# Patient Record
Sex: MALE | Race: WHITE | NOT HISPANIC OR LATINO | URBAN - METROPOLITAN AREA
[De-identification: names, ages, dates, MRNs, and addresses within clinical notes are randomized per-mention and may not be internally consistent; named-entity substitution may affect disease eponyms.]

---

## 2019-04-12 ENCOUNTER — EMERGENCY (EMERGENCY)
Facility: HOSPITAL | Age: 29
LOS: 1 days | Discharge: HOME | End: 2019-04-12
Attending: STUDENT IN AN ORGANIZED HEALTH CARE EDUCATION/TRAINING PROGRAM
Payer: COMMERCIAL

## 2019-04-12 VITALS — WEIGHT: 179.9 LBS | HEIGHT: 67 IN

## 2019-04-12 LAB
ALBUMIN SERPL ELPH-MCNC: 4.6 G/DL — SIGNIFICANT CHANGE UP (ref 3.3–5.2)
ALP SERPL-CCNC: 87 U/L — SIGNIFICANT CHANGE UP (ref 40–120)
ALT FLD-CCNC: 31 U/L — SIGNIFICANT CHANGE UP
ANION GAP SERPL CALC-SCNC: 12 MMOL/L — SIGNIFICANT CHANGE UP (ref 5–17)
AST SERPL-CCNC: 28 U/L — SIGNIFICANT CHANGE UP
BASOPHILS # BLD AUTO: 0 K/UL — SIGNIFICANT CHANGE UP (ref 0–0.2)
BASOPHILS NFR BLD AUTO: 0.4 % — SIGNIFICANT CHANGE UP (ref 0–2)
BILIRUB SERPL-MCNC: 0.5 MG/DL — SIGNIFICANT CHANGE UP (ref 0.4–2)
BUN SERPL-MCNC: 21 MG/DL — HIGH (ref 8–20)
CALCIUM SERPL-MCNC: 9.4 MG/DL — SIGNIFICANT CHANGE UP (ref 8.6–10.2)
CHLORIDE SERPL-SCNC: 103 MMOL/L — SIGNIFICANT CHANGE UP (ref 98–107)
CO2 SERPL-SCNC: 25 MMOL/L — SIGNIFICANT CHANGE UP (ref 22–29)
CREAT SERPL-MCNC: 1.06 MG/DL — SIGNIFICANT CHANGE UP (ref 0.5–1.3)
EOSINOPHIL # BLD AUTO: 0.1 K/UL — SIGNIFICANT CHANGE UP (ref 0–0.5)
EOSINOPHIL NFR BLD AUTO: 2.6 % — SIGNIFICANT CHANGE UP (ref 0–5)
GLUCOSE SERPL-MCNC: 103 MG/DL — SIGNIFICANT CHANGE UP (ref 70–115)
HCT VFR BLD CALC: 43.5 % — SIGNIFICANT CHANGE UP (ref 42–52)
HGB BLD-MCNC: 15.5 G/DL — SIGNIFICANT CHANGE UP (ref 14–18)
LYMPHOCYTES # BLD AUTO: 1.3 K/UL — SIGNIFICANT CHANGE UP (ref 1–4.8)
LYMPHOCYTES # BLD AUTO: 28.3 % — SIGNIFICANT CHANGE UP (ref 20–55)
MAGNESIUM SERPL-MCNC: 2 MG/DL — SIGNIFICANT CHANGE UP (ref 1.6–2.6)
MCHC RBC-ENTMCNC: 31.8 PG — HIGH (ref 27–31)
MCHC RBC-ENTMCNC: 35.6 G/DL — SIGNIFICANT CHANGE UP (ref 32–36)
MCV RBC AUTO: 89.1 FL — SIGNIFICANT CHANGE UP (ref 80–94)
MONOCYTES # BLD AUTO: 0.2 K/UL — SIGNIFICANT CHANGE UP (ref 0–0.8)
MONOCYTES NFR BLD AUTO: 5.1 % — SIGNIFICANT CHANGE UP (ref 3–10)
NEUTROPHILS # BLD AUTO: 3 K/UL — SIGNIFICANT CHANGE UP (ref 1.8–8)
NEUTROPHILS NFR BLD AUTO: 63.6 % — SIGNIFICANT CHANGE UP (ref 37–73)
PHOSPHATE SERPL-MCNC: 3 MG/DL — SIGNIFICANT CHANGE UP (ref 2.4–4.7)
PLATELET # BLD AUTO: 182 K/UL — SIGNIFICANT CHANGE UP (ref 150–400)
POTASSIUM SERPL-MCNC: 4.2 MMOL/L — SIGNIFICANT CHANGE UP (ref 3.5–5.3)
POTASSIUM SERPL-SCNC: 4.2 MMOL/L — SIGNIFICANT CHANGE UP (ref 3.5–5.3)
PROT SERPL-MCNC: 7.2 G/DL — SIGNIFICANT CHANGE UP (ref 6.6–8.7)
RBC # BLD: 4.88 M/UL — SIGNIFICANT CHANGE UP (ref 4.6–6.2)
RBC # FLD: 12.2 % — SIGNIFICANT CHANGE UP (ref 11–15.6)
SODIUM SERPL-SCNC: 140 MMOL/L — SIGNIFICANT CHANGE UP (ref 135–145)
WBC # BLD: 4.7 K/UL — LOW (ref 4.8–10.8)
WBC # FLD AUTO: 4.7 K/UL — LOW (ref 4.8–10.8)

## 2019-04-12 PROCEDURE — 93005 ELECTROCARDIOGRAM TRACING: CPT

## 2019-04-12 PROCEDURE — 83735 ASSAY OF MAGNESIUM: CPT

## 2019-04-12 PROCEDURE — 36415 COLL VENOUS BLD VENIPUNCTURE: CPT

## 2019-04-12 PROCEDURE — 80053 COMPREHEN METABOLIC PANEL: CPT

## 2019-04-12 PROCEDURE — 85027 COMPLETE CBC AUTOMATED: CPT

## 2019-04-12 PROCEDURE — 84100 ASSAY OF PHOSPHORUS: CPT

## 2019-04-12 PROCEDURE — 73140 X-RAY EXAM OF FINGER(S): CPT

## 2019-04-12 PROCEDURE — 93010 ELECTROCARDIOGRAM REPORT: CPT

## 2019-04-12 PROCEDURE — 73140 X-RAY EXAM OF FINGER(S): CPT | Mod: 26,RT

## 2019-04-12 PROCEDURE — 99284 EMERGENCY DEPT VISIT MOD MDM: CPT | Mod: 25

## 2019-04-12 PROCEDURE — 96374 THER/PROPH/DIAG INJ IV PUSH: CPT

## 2019-04-12 PROCEDURE — 99218: CPT

## 2019-04-12 PROCEDURE — G0378: CPT

## 2019-04-12 RX ORDER — KETOROLAC TROMETHAMINE 30 MG/ML
30 SYRINGE (ML) INJECTION ONCE
Qty: 0 | Refills: 0 | Status: DISCONTINUED | OUTPATIENT
Start: 2019-04-12 | End: 2019-04-12

## 2019-04-12 RX ORDER — IBUPROFEN 200 MG
600 TABLET ORAL EVERY 6 HOURS
Qty: 0 | Refills: 0 | Status: DISCONTINUED | OUTPATIENT
Start: 2019-04-12 | End: 2019-04-17

## 2019-04-12 RX ORDER — KETOROLAC TROMETHAMINE 30 MG/ML
60 SYRINGE (ML) INJECTION ONCE
Qty: 0 | Refills: 0 | Status: DISCONTINUED | OUTPATIENT
Start: 2019-04-12 | End: 2019-04-12

## 2019-04-12 RX ADMIN — Medication 30 MILLIGRAM(S): at 16:57

## 2019-04-12 NOTE — ED CDU PROVIDER INITIAL DAY NOTE - ATTENDING CONTRIBUTION TO CARE
Pt. well appearing. NO distress. NO open wound noted to right thumb. Pt. will be observed. I have discussed the plan with the ACP.

## 2019-04-12 NOTE — ED CDU PROVIDER INITIAL DAY NOTE - PHYSICAL EXAMINATION
Right thumb: no visible puncture wound, no swelling, no erythema, cap refill < 2 seconds, NVI, tendons intact. + FROM

## 2019-04-12 NOTE — CONSULT NOTE ADULT - SUBJECTIVE AND OBJECTIVE BOX
Pt Name: SVETA FORTE    MRN: [id number]      Patient is a 28y Male presenting to the emergency department with right 1st digit pain x 2.5 hours ago. Patient is a  stating that he was working with a hydraulic hose on a work truck when a valve came off and help his thumb over the area. After the incident the patient states that the tip of his thumb and other 4 digits were tingling and he felt nauseous and vomitted. Patient states he is right hand dominant and denies any loss of motion of the digit. Patient denies fever/chills, HA, numbness.       REVIEW OF SYSTEMS    General: Alert, responsive, in NAD      Musculoskeletal: SEE HPI.    Neurological: No sensory or motor changes.       PAST MEDICAL & SURGICAL HISTORY:  PAST MEDICAL & SURGICAL HISTORY:      Allergies: No Known Allergies      Medications:     FAMILY HISTORY:  : non-contributory                          15.5   4.7   )-----------( 182      ( 12 Apr 2019 17:05 )             43.5       04-12    140  |  103  |  21.0<H>  ----------------------------<  103  4.2   |  25.0  |  1.06    Ca    9.4      12 Apr 2019 17:05  Phos  3.0     04-12  Mg     2.0     04-12    TPro  7.2  /  Alb  4.6  /  TBili  0.5  /  DBili  x   /  AST  28  /  ALT  31  /  AlkPhos  87  04-12      Vital Signs Last 24 Hrs  T(C): 36.9 (12 Apr 2019 14:55), Max: 36.9 (12 Apr 2019 14:55)  T(F): 98.5 (12 Apr 2019 14:55), Max: 98.5 (12 Apr 2019 14:55)  HR: 86 (12 Apr 2019 14:55) (86 - 86)  BP: 135/96 (12 Apr 2019 14:55) (135/96 - 135/96)  BP(mean): --  RR: 18 (12 Apr 2019 14:55) (18 - 18)  SpO2: 99% (12 Apr 2019 14:55) (99% - 99%)    Daily Height in cm: 170.18 (12 Apr 2019 14:54)    Daily       PHYSICAL EXAM:      Appearance: Alert, responsive, in no acute distress, lying in bed    Neurological: Sensation is grossly intact to light touch. 5/5 motor function of all extremities. No focal deficits or weaknesses found.    Skin: no rash on visible skin. Skin is clean, dry and intact. No bleeding. No abrasions. No ulcerations noted.    Vascular: 2+ distal pulses. Cap refill < 2 sec. No signs of venous insufficiency or stasis. No extremity ulcerations. No cyanosis.    Musculoskeletal:         Right Upper Extremity: 5/5 WE/WF, Full ROM of digits, +  strength. 5/5 Flexion/extension of MCP and IP of 1st digit. Non tender to palpation along first digit. Cap refil <2 seconds    Image studies:     A/P:  Pt is a 28y Male s/p right thumb injury without fracture or dislocation    PLAN:   - pain control   - xrays reviewed  - D/W Dr. Barreto, patient will follow up outpatient on 4/15/2019 and recommended splint Pt Name: SVETA FORTE    MRN: [id number]      Patient is a 28y Male presenting to the emergency department with right 1st digit pain x 2.5 hours ago. Patient is a  stating that he was working with a hydraulic hose on a work truck when a valve came off and he placed his thumb over the area. After the incident the patient states that the tip of his thumb and other 4 digits were tingling and he felt nauseous and vomited one time. Patient states he is right hand dominant and denies any loss of motion of the digit. Patient denies fever/chills, HA, numbness.       REVIEW OF SYSTEMS    General: Alert, responsive, in NAD    Musculoskeletal: SEE HPI.    Neurological: No sensory or motor changes.       PAST MEDICAL & SURGICAL HISTORY:  PAST MEDICAL & SURGICAL HISTORY:      Allergies: No Known Allergies      Medications:     FAMILY HISTORY:  : non-contributory                          15.5   4.7   )-----------( 182      ( 12 Apr 2019 17:05 )             43.5       04-12    140  |  103  |  21.0<H>  ----------------------------<  103  4.2   |  25.0  |  1.06    Ca    9.4      12 Apr 2019 17:05  Phos  3.0     04-12  Mg     2.0     04-12    TPro  7.2  /  Alb  4.6  /  TBili  0.5  /  DBili  x   /  AST  28  /  ALT  31  /  AlkPhos  87  04-12      Vital Signs Last 24 Hrs  T(C): 36.9 (12 Apr 2019 14:55), Max: 36.9 (12 Apr 2019 14:55)  T(F): 98.5 (12 Apr 2019 14:55), Max: 98.5 (12 Apr 2019 14:55)  HR: 86 (12 Apr 2019 14:55) (86 - 86)  BP: 135/96 (12 Apr 2019 14:55) (135/96 - 135/96)  BP(mean): --  RR: 18 (12 Apr 2019 14:55) (18 - 18)  SpO2: 99% (12 Apr 2019 14:55) (99% - 99%)    Daily Height in cm: 170.18 (12 Apr 2019 14:54)    Daily       PHYSICAL EXAM:      Appearance: Alert, responsive, in no acute distress, lying in bed    Neurological: Sensation is grossly intact to light touch. 5/5 motor function of all extremities. No focal deficits or weaknesses found.    Skin: no rash on visible skin. Skin is clean, dry and intact. No bleeding. No abrasions. No ulcerations noted.    Vascular: 2+ distal pulses. Cap refill < 2 sec. No signs of venous insufficiency or stasis. No extremity ulcerations. No cyanosis.    Musculoskeletal:         Right Upper Extremity: 5/5 WE/WF, Full ROM of digits, +  strength. 5/5 Flexion/extension of MCP and IP of 1st digit. Non tender to palpation along first digit. Cap refil <2 seconds    Image studies:     A/P:  Pt is a 28y Male s/p right thumb pain    PLAN:   - Pain control   - x-rays reviewed  - D/W Dr. Barreto, patient will follow up outpatient on 4/15/2019 and recommended splint Pt Name: SVETA FORTE    MRN: [id number]      Patient is a 28y Male presenting to the emergency department with right 1st digit pain x 2.5 hours ago. Patient is a  stating that he was working with a hydraulic hose on a work truck when a valve came off and he placed his thumb over the area. After the incident the patient states that the tip of his thumb and other 4 digits were tingling with numbness going up the volar aspect of the forearm. States he also felt nauseous and vomited one time. Since the incident patient reports numbness along the forearm resolved completely. Patient states he is right hand dominant and denies any loss of motion of the digit. Patient denies fever/chills, HA, numbness.       REVIEW OF SYSTEMS    General: Alert, responsive, in NAD    Musculoskeletal: SEE HPI.    Neurological: No sensory or motor changes.       PAST MEDICAL & SURGICAL HISTORY:  PAST MEDICAL & SURGICAL HISTORY:      Allergies: No Known Allergies      Medications:     FAMILY HISTORY:  : non-contributory                          15.5   4.7   )-----------( 182      ( 12 Apr 2019 17:05 )             43.5       04-12    140  |  103  |  21.0<H>  ----------------------------<  103  4.2   |  25.0  |  1.06    Ca    9.4      12 Apr 2019 17:05  Phos  3.0     04-12  Mg     2.0     04-12    TPro  7.2  /  Alb  4.6  /  TBili  0.5  /  DBili  x   /  AST  28  /  ALT  31  /  AlkPhos  87  04-12      Vital Signs Last 24 Hrs  T(C): 36.9 (12 Apr 2019 14:55), Max: 36.9 (12 Apr 2019 14:55)  T(F): 98.5 (12 Apr 2019 14:55), Max: 98.5 (12 Apr 2019 14:55)  HR: 86 (12 Apr 2019 14:55) (86 - 86)  BP: 135/96 (12 Apr 2019 14:55) (135/96 - 135/96)  BP(mean): --  RR: 18 (12 Apr 2019 14:55) (18 - 18)  SpO2: 99% (12 Apr 2019 14:55) (99% - 99%)    Daily Height in cm: 170.18 (12 Apr 2019 14:54)    Daily       PHYSICAL EXAM:      Appearance: Alert, responsive, in no acute distress, lying in bed    Neurological: Sensation is grossly intact to light touch. 5/5 motor function of all extremities. No focal deficits or weaknesses found.    Skin: no rash on visible skin. Skin is clean, dry and intact. No bleeding. No abrasions. No ulcerations noted.    Vascular: 2+ distal pulses. Cap refill < 2 sec. No signs of venous insufficiency or stasis. No extremity ulcerations. No cyanosis.    Musculoskeletal:         Right Upper Extremity: 5/5 WE/WF, Full ROM of digits, +  strength. 5/5 Flexion/extension of MCP and IP of 1st digit. Non tender to palpation along first digit. Cap refil <2 seconds    Image studies:   preliminary read: no fracture or dislocation    A/P:  Pt is a 28y Male s/p right thumb pain    PLAN:   - Pain control   - x-rays reviewed  - D/W Dr. Barreto, patient will follow up outpatient on 4/15/2019 and recommended splint

## 2019-04-12 NOTE — ED STATDOCS - PROGRESS NOTE DETAILS
Dr. Nash consulted tox and spoke to Zackary Edwards (phone # [phone]) who recommended labs, EKG and hand consult. hand consult appreciated patient amendable to observation for neuro checks, care signed out to VIDAL Whitt

## 2019-04-12 NOTE — ED STATDOCS - OBJECTIVE STATEMENT
27 y/o M pt presents to ED c/o sudden onset right thumb pain x 1 hour PTA. 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 Reports a burning sensation in his right thumb. Washed his hand immediately PTA. IS not sure what the composition of the hydraulic fluid was. Did not fall or hit his head. No further complaints at this time.

## 2019-04-12 NOTE — ED STATDOCS - ATTENDING CONTRIBUTION TO CARE
I performed a face to face history and physical exam of the patient and discussed their management with the resident/ACP. I reviewed the resident/ACP's note and agree with the documented findings and plan of care.      Pt with R thumb pain s/p high-pressure hydraulic injury.  no definite break in skin seen.      physical - ttp over R thumb.    plan - ortho evaluated patient. labs reviewed. will put in CDU for neurovascular checks.

## 2019-04-12 NOTE — CONSULT NOTE ADULT - ATTENDING COMMENTS
I discussed the above patient's condition with our in house physician assistant and reviewed relevant clinical photos and imaging.  Agree with above assessment and plan.

## 2019-04-12 NOTE — ED STATDOCS - MUSCULOSKELETAL FINDINGS, MLM
R thumb, +FROM, motor and sensory sensation intact, cap refill < 2sec, radial pulse intact, adduction and abduction intact, skin warm and dry

## 2019-04-12 NOTE — ED CDU PROVIDER INITIAL DAY NOTE - MEDICAL DECISION MAKING DETAILS
29 y/o male admitted to observation s/p high pressure injury  evaluated by hand surgery in ED, will monitor overnight and re-eval in AM

## 2019-04-12 NOTE — ED ADULT TRIAGE NOTE - CHIEF COMPLAINT QUOTE
Pt was at work and hold his thumb over a hose with hydraulic fluid in it. Pt states that it was a lot of pressure going into his thumb and "now doesn't feel right". Pt thinks the fluid went through his skin.

## 2019-04-12 NOTE — ED ADULT NURSE NOTE - OBJECTIVE STATEMENT
28 yr old male a+ox4 presents to ED c/o numbness and burning to fingers on right hand s/p injury at work.  pt states he put his thumb over a hose that was leaking hydraulic fluid to try to prevent it from leaking.  pt able to move all fingers.  no sensory loss.  no obvious injuries present.

## 2019-04-13 VITALS
OXYGEN SATURATION: 100 % | RESPIRATION RATE: 17 BRPM | HEART RATE: 61 BPM | SYSTOLIC BLOOD PRESSURE: 94 MMHG | TEMPERATURE: 98 F | DIASTOLIC BLOOD PRESSURE: 56 MMHG

## 2019-04-13 PROCEDURE — 99217: CPT

## 2019-04-13 NOTE — PROGRESS NOTE ADULT - SUBJECTIVE AND OBJECTIVE BOX
Patient seen and examined bedside around 03:00.  Resting comfortably.  Has no complaints at this time.  Noticed significant improvement to the pain in his thumb.  Full active ROM.  No obvious swelling or redness to thumb.  No entry wound visible.  Denies numbness/tingling.  No pain to palpation

## 2019-04-13 NOTE — ED CDU PROVIDER DISPOSITION NOTE - ATTENDING CONTRIBUTION TO CARE
Pt. has been observed in the ED with no alarming issues. Pt. will need to follow up with the hand surgeon. I have discussed the plan with the ACP.

## 2019-04-13 NOTE — ED ADULT NURSE REASSESSMENT NOTE - RESPIRATORY ASSESSMENT
Pt has upcoming preop/clearance appointment 05/22/17 but there is no DOS.  Called to [facility] and they are not aware of any scheduled surgery for pt and pt has not called to setup bridging of front teeth that was recommended at last visit with them November 2016.  [facility] [place].  Need to know DOS, location, what is being done and any requested preop info.  [facility] will not do preop with out DOS.   
WDL
WDL

## 2019-04-13 NOTE — ED CDU PROVIDER SUBSEQUENT DAY NOTE - MEDICAL DECISION MAKING DETAILS
Pt in CDU for overnight eval and neuro checks. No new findings, or worsening. Stable for dc. D/c with f/u with Dr. Barreto as outpatient.

## 2019-04-13 NOTE — ED ADULT NURSE REASSESSMENT NOTE - NS ED NURSE REASSESS COMMENT FT1
Dc instructions provided. educated pt with all written instructions. pt verbalizes understanding. pt with no questions or concerns. VSS and documented as per flow sheet. pt ambulated out ED with steady gait. ED registration made aware of pt's dispo status.
pt sleeping in stretcher, no apparent distress noted. denies any headache, dizziness, numbness or tingling.   bed in lowest position call bell within reach  and safety maintained.
pt sleeping in stretcher, no apparent distress noted. bed in lowest position and safety maintained.
assumed pt care from previous Rn Melissa Cody.  pt transported to CDU-8 for observation. pt  A&Ox3;  resting in stretcher, with no complaints of pain or discomfort. pt stated he was at working and put his thumb over a hose and some hydraulic fluid came out. pt reported he felt like he a nail went through his skin so he came to ED.   right thumb with no signs of redness. (+) radial pulse. able to move all extremities. B/L lungs clear, normal s1&s2 heard on ausculation. (+) pedal pulses, skin warm/dry intact. IV patent. VSS and documented as per flow sheet. plan of care reviewed and pt verbalizes understanding. bed in lowest position, call bell within reach and safety maintained. monitoring ongoing for any changes.

## 2019-04-13 NOTE — ED CDU PROVIDER DISPOSITION NOTE - CLINICAL COURSE
27 y/o M pt presents to ED c/o sudden onset right thumb pain x 1 hour PTA. 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 Reports a burning sensation in his right thumb. Washed his hand immediately PTA. Pt is not sure what the composition of the hydraulic fluid was. Did not fall or hit his head. No further complaints at this time. No new findings or worsening of clinical exam over night. NV intact, no new swelling, erythema, warmth. VS stable, pt stable for dc at this time. Ortho note appreciated to follow-up with Ortho Dr. Barreto as outpatient on 4/15.

## 2019-04-13 NOTE — ED CDU PROVIDER SUBSEQUENT DAY NOTE - HISTORY
29 y/o M pt presents to ED c/o sudden onset right thumb pain x 1 hour PTA. 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 Reports a burning sensation in his right thumb. Washed his hand immediately PTA. Pt is not sure what the composition of the hydraulic fluid was. Did not fall or hit his head. No further complaints over night.

## 2019-05-03 PROBLEM — Z78.9 OTHER SPECIFIED HEALTH STATUS: Chronic | Status: ACTIVE | Noted: 2019-04-12

## 2019-05-06 ENCOUNTER — APPOINTMENT (OUTPATIENT)
Dept: ORTHOPEDIC SURGERY | Facility: CLINIC | Age: 29
End: 2019-05-06
Payer: COMMERCIAL

## 2019-05-06 VITALS — BODY MASS INDEX: 28.79 KG/M2 | WEIGHT: 190 LBS | HEIGHT: 68 IN

## 2019-05-06 DIAGNOSIS — Z78.9 OTHER SPECIFIED HEALTH STATUS: ICD-10-CM

## 2019-05-06 DIAGNOSIS — Z80.9 FAMILY HISTORY OF MALIGNANT NEOPLASM, UNSPECIFIED: ICD-10-CM

## 2019-05-06 DIAGNOSIS — R22.31 LOCALIZED SWELLING, MASS AND LUMP, RIGHT UPPER LIMB: ICD-10-CM

## 2019-05-06 DIAGNOSIS — M79.641 PAIN IN RIGHT HAND: ICD-10-CM

## 2019-05-06 DIAGNOSIS — M77.9 ENTHESOPATHY, UNSPECIFIED: ICD-10-CM

## 2019-05-06 PROCEDURE — 73130 X-RAY EXAM OF HAND: CPT | Mod: RT

## 2019-05-06 PROCEDURE — 99203 OFFICE O/P NEW LOW 30 MIN: CPT | Mod: 25

## 2019-05-06 PROCEDURE — 20550 NJX 1 TENDON SHEATH/LIGAMENT: CPT | Mod: F7

## 2019-05-06 PROCEDURE — 73110 X-RAY EXAM OF WRIST: CPT | Mod: RT

## 2019-05-06 NOTE — ADDENDUM
[FreeTextEntry1] : This note was written by Olga Snowden on 05/06/2019 acting as scribe for Juan Terry M.D.\par \par All medical record entries made by the Scribe were at my, Dr. Juan Terry, direction and personally dictated by me on 05/06/2019. I have personally reviewed the chart and agree that the record accurately reflects my personal performance of the history, physical exam, assessment and plan.

## 2019-05-06 NOTE — END OF VISIT
[FreeTextEntry3] : I, Juan Terry MD, ordering physician, have read and attest that all the information, medical decision making and discharge instructions within are true and accurate\par

## 2019-05-06 NOTE — ASSESSMENT
[FreeTextEntry1] : 28 year old male presents with decreased sensation of the right ring fingertip secondary to a contusion as well as right ring and middle trigger fingers. We discussed the nature of the condition and treatment options. The patient wishes to proceed with a cortisone injection at this time. Under sterile precautions, an injection of 0.5 cc 1% lidocaine with 0.25 cc of Kenalog and 0.25 cc of Dexamethasone was placed into the right ring and long finger A1 pulleys. Rest and apply ice. Surgery is not recommended at this time, but it was described and discussed. Soak hand in warm water and Epsom salt. We discussed the use of NSAIDs as tolerated. \par An MRI of the right forearm will be ordered to evaluate the volar forearm mass.

## 2019-05-06 NOTE — HISTORY OF PRESENT ILLNESS
[Right] : right hand dominant [FreeTextEntry1] : Pt is a 29 y/o RHD male  c/o right long and right ring finger pain and triggering x 2-3 weeks.  The fingers lock in the morning.  He has tenderness over the A1 pulleys.  The symptoms improve throughout the day.\par He states that he jammed his right ring finger about 4 weeks ago when he was using a .  He slipped and jammed the finger into the handle.  He started to have pain and numbness afterwards.  He did not have swelling or ecchymosis.  He has not had xrays for this.  \par He has a mass in his right forearm that is tender to touch.\par He notes a history of CTS.  He has numbness and tingling when he wakes in the morning x 2-3 years.  He has never had an EMG or cortisone injection.  His PCP told him to wear a wrist support splint at night.  He wears it infrequently.  He is unsure if it wakes him from sleep because his children wake him throughout the night.\par He also notes a mass present over the right volar forearm that has been present for a few years.

## 2019-05-06 NOTE — PHYSICAL EXAM
[de-identified] : Patient is well-nourished, well developed, alert and in no acute distress. Breathing is unlabored. He is grossly oriented to person, place and time.\par \par Right Hand: There is tenderness to palpation over the volar proximal phalanx of the middle and ring fingers, no tenderness over the A1 pulleys. He has full arc of motion in the fingers. All intrinsic and extrinsic hand muscles are 5/5. There is no joint instability on provocative testing. Sensation is intact to light touch to all the fingers except the tip of the ring finger, he has some subjective decreased sensation. There are no skin lesions or discolorations. \par There is a small mass present over the volar aspect of the right forearm that is minimally tender to palpation.  [de-identified] : Right Wrist: AP, lateral and oblique views of the hand including the wrist were obtained and revealed no abnormalities. \par

## 2020-05-18 ENCOUNTER — APPOINTMENT (OUTPATIENT)
Dept: ORTHOPEDIC SURGERY | Facility: CLINIC | Age: 30
End: 2020-05-18

## 2020-05-28 ENCOUNTER — APPOINTMENT (OUTPATIENT)
Dept: ORTHOPEDIC SURGERY | Facility: CLINIC | Age: 30
End: 2020-05-28
Payer: COMMERCIAL

## 2020-05-28 DIAGNOSIS — G56.03 CARPAL TUNNEL SYNDROM,BILATERAL UPPER LIMBS: ICD-10-CM

## 2020-05-28 PROCEDURE — 99213 OFFICE O/P EST LOW 20 MIN: CPT | Mod: 25

## 2020-05-28 PROCEDURE — 20550 NJX 1 TENDON SHEATH/LIGAMENT: CPT | Mod: F7

## 2020-05-28 NOTE — DISCUSSION/SUMMARY
[FreeTextEntry1] : The underlying pathophysiology was reviewed with the patient. Treatment options were discussed including; observation, NSAIDS, analgesics, injection and surgical intervention. \par \par The patient wishes to proceed with a cortisone injection at this time. The skin was prepped with alcohol and sprayed with Ethyl Chloride. An injection of 0.5 cc 1% Lidocaine without epinephrine, 0.25 cc Kenalog 40mg, and 0.25 cc Dexamethasone was administered into the flexor tendon sheath of the right long finger (2/3). The patient tolerated the procedure well. Apply ice. \par \par The patient was advised to soak the hand in warm water and Epsom salt. \par NSAIDs, as tolerated, were advised for pain and symptom management. \par Follow up as needed.

## 2020-05-28 NOTE — ADDENDUM
[FreeTextEntry1] : I, Kelsie Mcghee wrote this note acting as a scribe for Dr. Juan Terry on May 28, 2020.

## 2020-05-28 NOTE — PHYSICAL EXAM
[de-identified] : Patient is well-nourished, well developed, alert and in no acute distress. Breathing is unlabored. He is grossly oriented to person, place and time.\par \par Right Hand: There is tenderness to palpation over the volar proximal phalanx of the middle finger. There is tenderness over the A1 pulleys. He has full arc of motion in the fingers. All intrinsic and extrinsic hand muscles are 5/5. There is no joint instability on provocative testing. Sensation is intact to light touch to all the fingers except the tip of the ring finger, he has some subjective decreased sensation. There are no skin lesions or discolorations. \par There is a small mass present over the volar aspect of the right forearm that is minimally tender to palpation.  [de-identified] : No imaging done today. \par

## 2020-07-23 ENCOUNTER — APPOINTMENT (OUTPATIENT)
Dept: ORTHOPEDIC SURGERY | Facility: CLINIC | Age: 30
End: 2020-07-23
Payer: COMMERCIAL

## 2020-07-23 DIAGNOSIS — M70.61 TROCHANTERIC BURSITIS, RIGHT HIP: ICD-10-CM

## 2020-07-23 DIAGNOSIS — S76.011A STRAIN OF MUSCLE, FASCIA AND TENDON OF RIGHT HIP, INITIAL ENCOUNTER: ICD-10-CM

## 2020-07-23 PROCEDURE — 99213 OFFICE O/P EST LOW 20 MIN: CPT | Mod: 25

## 2020-07-23 PROCEDURE — 20610 DRAIN/INJ JOINT/BURSA W/O US: CPT | Mod: RT

## 2020-07-24 NOTE — ADDENDUM
[FreeTextEntry1] : I, Kelsie Mcghee wrote this note acting as a scribe for Dr. Juan Terry on Jul 23, 2020.

## 2020-07-24 NOTE — HISTORY OF PRESENT ILLNESS
[de-identified] : Pt is a 28 y/o male who presents c/o right hip pain x 10 months.  He reports jumping over a small fence and when he landed on the leg, he had immediate and sudden pain. He states that the hip never returned to normal following that event. He localizes his pain laterally directly over the greater trochanter. He has pain when he walks and stands.  It is painful to go up and down stairs.  He states that the pain is the worse when he wakes up in the morning.  The hip feels tight.  He was seen by a chiropractor for his neck and he mentioned the hip pain.  He was sent for xray and MRI.  He states that the pain is worsening.\par He also reports radiating right-sided lower back pain. It extends into the buttocks and he drapes it along the posterior leg and into the lateral right ankle.

## 2020-07-24 NOTE — PHYSICAL EXAM
[de-identified] : MRI of the right hip taken on 12/12/2019 at [facility] revealed a Grade 1 strains of the right gluteal fernando and gluteus medius tendons insertions at the greater trochanter. Small bilateral joint effusions.  [de-identified] : Patient is WDWN, alert, and in no acute distress. Breathing is unlabored. He is grossly oriented to person, place, and time. \par \par Right Lower Extremity\par Buttock: no tenderness, swelling, or deformities\par Hip:\par Inspection/Palpation: Tenderness directly over the trochanteric bursa. \par Range of Motion: full and painless in all planes, no crepitus\par Stability: joint stability intact\par Strength: extension, flexion, adduction, abduction, internal rotation and external rotation 5/5

## 2020-10-15 ENCOUNTER — APPOINTMENT (OUTPATIENT)
Dept: ORTHOPEDIC SURGERY | Facility: CLINIC | Age: 30
End: 2020-10-15
Payer: COMMERCIAL

## 2020-10-15 DIAGNOSIS — G56.21 LESION OF ULNAR NERVE, RIGHT UPPER LIMB: ICD-10-CM

## 2020-10-15 DIAGNOSIS — G56.01 CARPAL TUNNEL SYNDROME, RIGHT UPPER LIMB: ICD-10-CM

## 2020-10-15 DIAGNOSIS — M65.331 TRIGGER FINGER, RIGHT MIDDLE FINGER: ICD-10-CM

## 2020-10-15 PROCEDURE — 20550 NJX 1 TENDON SHEATH/LIGAMENT: CPT | Mod: 59,F7

## 2020-10-15 PROCEDURE — 20526 THER INJECTION CARP TUNNEL: CPT | Mod: RT

## 2020-10-15 PROCEDURE — 99214 OFFICE O/P EST MOD 30 MIN: CPT | Mod: 25

## 2020-10-16 NOTE — END OF VISIT
[FreeTextEntry3] : I, Juan Terry MD, ordering physician, have read and attest that all the information, medical decision making and discharge instructions within are true and accurate.

## 2020-10-16 NOTE — ADDENDUM
[FreeTextEntry1] : I, Nella Yanez wrote this note acting as a scribe for Dr. Juan Terry on Oct 15, 2020.

## 2020-10-16 NOTE — DISCUSSION/SUMMARY
[de-identified] : The underlying pathophysiology was reviewed with the patient. Treatment options were discussed including; observation, NSAIDS, analgesics, injection and surgical intervention. \par \par Dx. Stenosing tenosynovitis \par The patient wishes to proceed with a cortisone injection at this time. The skin was prepped with alcohol and sprayed with Ethyl Chloride. An injection of 0.5 cc 1% Lidocaine without epinephrine, 0.25 cc Kenalog 40mg, and 0.25 cc Dexamethasone was administered into the flexor tendon sheath of the right long finger. The patient tolerated the procedure well. Apply ice. \par \par The patient wishes to proceed with a cortisone injection at this time. The skin was prepped with alcohol and sprayed with Ethyl Chloride. An injection of 0.5 cc 1% Lidocaine without epinephrine, 0.25 cc Kenalog 40 mg, and 0.25 cc Dexamethasone was administered into the right carpal tunnel. The patient tolerated the procedure well. Apply ice. \par \par The patient was encouraged to utilize a carpal tunnel wrist brace if he is to engage in any strenuous activity or while sleeping to avoid unintended compression of the median nerve. \par He should soak the hand in warm water and Epsom salts.\par NSAIDs as tolerated. \par The patient wishes to proceed with an EMG. A script was given.

## 2020-10-16 NOTE — HISTORY OF PRESENT ILLNESS
[de-identified] : Pt is a 28 y/o male who presents c/o right hip pain x 10 months.  He reports jumping over a small fence and when he landed on the leg, he had immediate and sudden pain. He states that the hip never returned to normal following that event. He localizes his pain laterally directly over the greater trochanter. He has pain when he walks and stands.  It is painful to go up and down stairs.  He states that the pain is the worse when he wakes up in the morning.  The hip feels tight.  He was seen by a chiropractor for his neck and he mentioned the hip pain.  He was sent for xray and MRI.  He states that the pain is worsening.\par He also reports radiating right-sided lower back pain. It extends into the buttocks and he drapes it along the posterior leg and into the lateral right ankle. \par He returns stating that his right long trigger finger has returned as well as his thumb and index fingers. He states that he wakes up from numbness in his hand.

## 2020-10-16 NOTE — PHYSICAL EXAM
[de-identified] : Patient is WDWN, alert, and in no acute distress. Breathing is unlabored. He is grossly oriented to person, place, and time. \par \par Right hand: There is A1 pulley tenderness in the thumb, index, and long finger. Full arc of motion in the fingers, and all intrinsic and extrinsic hand muscles 5/5. No joint instability on provocative testing, sensation is intact to light touch, and no skin lesions or discoloration. \par \par Right Wrist: No tenderness, edema, or deformities. No thenar atrophy. Full ROM with decreased sensation along median nerve distribution. \par Tests/Signs: Tinel's sign is positive over carpal tunnel, Phalen's test is positive. \par \par Right Ulnar Nerve weakness. \par \par Right Wrist: Mass on the ulnar side\par \par Right Lower Extremity\par Buttock: no tenderness, swelling, or deformities\par Hip:\par Inspection/Palpation: Tenderness directly over the trochanteric bursa. \par Range of Motion: full and painless in all planes, no crepitus\par Stability: joint stability intact\par Strength: extension, flexion, adduction, abduction, internal rotation and external rotation 5/5  [de-identified] : MRI of the right hip taken on 12/12/2019 at [facility] revealed a Grade 1 strains of the right gluteal fernando and gluteus medius tendons insertions at the greater trochanter. Small bilateral joint effusions.

## 2021-10-30 ENCOUNTER — TRANSCRIPTION ENCOUNTER (OUTPATIENT)
Age: 31
End: 2021-10-30

## 2022-12-05 NOTE — ED STATDOCS - NS ED MD EM SELECTION
Rest and encourage oral fluids as much as possible  Use saline nasal spray in each nostril several times per day to help clear out drainage  Elevate head of bed if possible  May use cool mist humidifier in room   May give honey for sore throat or cough  Follow up if fever >101 develops, if condition worsens, or with other problems or concerns  Parent states understanding and agrees with treatment plan  [id number] Comprehensive

## 2023-12-15 ENCOUNTER — APPOINTMENT (OUTPATIENT)
Dept: CT IMAGING | Facility: IMAGING CENTER | Age: 33
End: 2023-12-15

## 2023-12-15 PROCEDURE — 70450 CT HEAD/BRAIN W/O DYE: CPT | Mod: 26

## 2023-12-19 ENCOUNTER — OUTPATIENT (OUTPATIENT)
Dept: OUTPATIENT SERVICES | Facility: HOSPITAL | Age: 33
LOS: 1 days | End: 2023-12-19
Payer: COMMERCIAL

## 2023-12-19 VITALS
HEART RATE: 70 BPM | OXYGEN SATURATION: 97 % | RESPIRATION RATE: 16 BRPM | WEIGHT: 175.05 LBS | HEIGHT: 68 IN | DIASTOLIC BLOOD PRESSURE: 83 MMHG | TEMPERATURE: 97 F | SYSTOLIC BLOOD PRESSURE: 130 MMHG

## 2023-12-19 DIAGNOSIS — Z87.81 PERSONAL HISTORY OF (HEALED) TRAUMATIC FRACTURE: Chronic | ICD-10-CM

## 2023-12-19 DIAGNOSIS — Q04.6 CONGENITAL CEREBRAL CYSTS: ICD-10-CM

## 2023-12-19 DIAGNOSIS — Z98.890 OTHER SPECIFIED POSTPROCEDURAL STATES: Chronic | ICD-10-CM

## 2023-12-19 DIAGNOSIS — G93.0 CEREBRAL CYSTS: ICD-10-CM

## 2023-12-19 LAB
ANION GAP SERPL CALC-SCNC: 10 MMOL/L — SIGNIFICANT CHANGE UP (ref 7–14)
ANION GAP SERPL CALC-SCNC: 10 MMOL/L — SIGNIFICANT CHANGE UP (ref 7–14)
BLD GP AB SCN SERPL QL: NEGATIVE — SIGNIFICANT CHANGE UP
BLD GP AB SCN SERPL QL: NEGATIVE — SIGNIFICANT CHANGE UP
BUN SERPL-MCNC: 19 MG/DL — SIGNIFICANT CHANGE UP (ref 7–23)
BUN SERPL-MCNC: 19 MG/DL — SIGNIFICANT CHANGE UP (ref 7–23)
CALCIUM SERPL-MCNC: 10 MG/DL — SIGNIFICANT CHANGE UP (ref 8.4–10.5)
CALCIUM SERPL-MCNC: 10 MG/DL — SIGNIFICANT CHANGE UP (ref 8.4–10.5)
CHLORIDE SERPL-SCNC: 102 MMOL/L — SIGNIFICANT CHANGE UP (ref 98–107)
CHLORIDE SERPL-SCNC: 102 MMOL/L — SIGNIFICANT CHANGE UP (ref 98–107)
CO2 SERPL-SCNC: 29 MMOL/L — SIGNIFICANT CHANGE UP (ref 22–31)
CO2 SERPL-SCNC: 29 MMOL/L — SIGNIFICANT CHANGE UP (ref 22–31)
CREAT SERPL-MCNC: 0.94 MG/DL — SIGNIFICANT CHANGE UP (ref 0.5–1.3)
CREAT SERPL-MCNC: 0.94 MG/DL — SIGNIFICANT CHANGE UP (ref 0.5–1.3)
EGFR: 111 ML/MIN/1.73M2 — SIGNIFICANT CHANGE UP
EGFR: 111 ML/MIN/1.73M2 — SIGNIFICANT CHANGE UP
GLUCOSE SERPL-MCNC: 120 MG/DL — HIGH (ref 70–99)
GLUCOSE SERPL-MCNC: 120 MG/DL — HIGH (ref 70–99)
HCT VFR BLD CALC: 44.5 % — SIGNIFICANT CHANGE UP (ref 39–50)
HCT VFR BLD CALC: 44.5 % — SIGNIFICANT CHANGE UP (ref 39–50)
HGB BLD-MCNC: 15.8 G/DL — SIGNIFICANT CHANGE UP (ref 13–17)
HGB BLD-MCNC: 15.8 G/DL — SIGNIFICANT CHANGE UP (ref 13–17)
MCHC RBC-ENTMCNC: 30.7 PG — SIGNIFICANT CHANGE UP (ref 27–34)
MCHC RBC-ENTMCNC: 30.7 PG — SIGNIFICANT CHANGE UP (ref 27–34)
MCHC RBC-ENTMCNC: 35.5 GM/DL — SIGNIFICANT CHANGE UP (ref 32–36)
MCHC RBC-ENTMCNC: 35.5 GM/DL — SIGNIFICANT CHANGE UP (ref 32–36)
MCV RBC AUTO: 86.6 FL — SIGNIFICANT CHANGE UP (ref 80–100)
MCV RBC AUTO: 86.6 FL — SIGNIFICANT CHANGE UP (ref 80–100)
NRBC # BLD: 0 /100 WBCS — SIGNIFICANT CHANGE UP (ref 0–0)
NRBC # BLD: 0 /100 WBCS — SIGNIFICANT CHANGE UP (ref 0–0)
NRBC # FLD: 0 K/UL — SIGNIFICANT CHANGE UP (ref 0–0)
NRBC # FLD: 0 K/UL — SIGNIFICANT CHANGE UP (ref 0–0)
PLATELET # BLD AUTO: 224 K/UL — SIGNIFICANT CHANGE UP (ref 150–400)
PLATELET # BLD AUTO: 224 K/UL — SIGNIFICANT CHANGE UP (ref 150–400)
POTASSIUM SERPL-MCNC: 3.5 MMOL/L — SIGNIFICANT CHANGE UP (ref 3.5–5.3)
POTASSIUM SERPL-MCNC: 3.5 MMOL/L — SIGNIFICANT CHANGE UP (ref 3.5–5.3)
POTASSIUM SERPL-SCNC: 3.5 MMOL/L — SIGNIFICANT CHANGE UP (ref 3.5–5.3)
POTASSIUM SERPL-SCNC: 3.5 MMOL/L — SIGNIFICANT CHANGE UP (ref 3.5–5.3)
RBC # BLD: 5.14 M/UL — SIGNIFICANT CHANGE UP (ref 4.2–5.8)
RBC # BLD: 5.14 M/UL — SIGNIFICANT CHANGE UP (ref 4.2–5.8)
RBC # FLD: 11.9 % — SIGNIFICANT CHANGE UP (ref 10.3–14.5)
RBC # FLD: 11.9 % — SIGNIFICANT CHANGE UP (ref 10.3–14.5)
RH IG SCN BLD-IMP: POSITIVE — SIGNIFICANT CHANGE UP
SODIUM SERPL-SCNC: 141 MMOL/L — SIGNIFICANT CHANGE UP (ref 135–145)
SODIUM SERPL-SCNC: 141 MMOL/L — SIGNIFICANT CHANGE UP (ref 135–145)
WBC # BLD: 5.95 K/UL — SIGNIFICANT CHANGE UP (ref 3.8–10.5)
WBC # BLD: 5.95 K/UL — SIGNIFICANT CHANGE UP (ref 3.8–10.5)
WBC # FLD AUTO: 5.95 K/UL — SIGNIFICANT CHANGE UP (ref 3.8–10.5)
WBC # FLD AUTO: 5.95 K/UL — SIGNIFICANT CHANGE UP (ref 3.8–10.5)

## 2023-12-19 NOTE — H&P PST ADULT - HISTORY OF PRESENT ILLNESS
Pt is a 33 yr old male scheduled for Stereotactic Fenestration of Right Posterior Parietal Cyst Poss Insertion of Cysto peritoneal Shunt   Pt is a 33 yr old male scheduled for Stereotactic Fenestration of Right Posterior Parietal Cyst Poss Insertion of Cysto peritoneal Shunt  with Dr Ornelas tentatively 12/27/23 - pt with prior hx fractured skull at age 3 weeks - now c/o of yrs of symptoms including HA , tingling of right cheek  and forehead, muscle tingling over body, loss of peripheral vision in both eyes that has worsened - leading patient to finally go to [facility] ER last week and evaluation noted cerebral cyst - pt then seen by surgeon for surgery -  Pt is a 33 yr old male scheduled for Stereotactic Fenestration of Right Posterior Parietal Cyst Poss Insertion of Cysto peritoneal Shunt  with Dr Ornelas tentatively 12/27/23 - pt with prior hx fractured skull at age 3 weeks - now c/o of yrs of symptoms including HA , tingling of right cheek  and forehead, muscle tingling over body, loss of peripheral vision in both eyes that has worsened - [facility] evaluation noted cerebral cyst - pt then seen by surgeon for surgery -

## 2023-12-19 NOTE — H&P PST ADULT - NSICDXPASTMEDICALHX_GEN_ALL_CORE_FT
PAST MEDICAL HISTORY:  No pertinent past medical history      PAST MEDICAL HISTORY:  Congenital cerebral cyst     No pertinent past medical history     Skull fracture

## 2023-12-20 ENCOUNTER — APPOINTMENT (OUTPATIENT)
Dept: NEUROLOGY | Facility: CLINIC | Age: 33
End: 2023-12-20

## 2023-12-21 ENCOUNTER — OUTPATIENT (OUTPATIENT)
Dept: OUTPATIENT SERVICES | Facility: HOSPITAL | Age: 33
LOS: 1 days | End: 2023-12-21
Payer: COMMERCIAL

## 2023-12-21 ENCOUNTER — APPOINTMENT (OUTPATIENT)
Dept: NEUROSURGERY | Facility: CLINIC | Age: 33
End: 2023-12-21
Payer: COMMERCIAL

## 2023-12-21 ENCOUNTER — APPOINTMENT (OUTPATIENT)
Dept: MRI IMAGING | Facility: HOSPITAL | Age: 33
End: 2023-12-21

## 2023-12-21 DIAGNOSIS — Z00.00 ENCOUNTER FOR GENERAL ADULT MEDICAL EXAMINATION WITHOUT ABNORMAL FINDINGS: ICD-10-CM

## 2023-12-21 DIAGNOSIS — G93.2 BENIGN INTRACRANIAL HYPERTENSION: ICD-10-CM

## 2023-12-21 DIAGNOSIS — G93.9 DISORDER OF BRAIN, UNSPECIFIED: ICD-10-CM

## 2023-12-21 DIAGNOSIS — H54.3 UNQUALIFIED VISUAL LOSS, BOTH EYES: ICD-10-CM

## 2023-12-21 DIAGNOSIS — Q04.6 CONGENITAL CEREBRAL CYSTS: ICD-10-CM

## 2023-12-21 DIAGNOSIS — Z98.890 OTHER SPECIFIED POSTPROCEDURAL STATES: Chronic | ICD-10-CM

## 2023-12-21 DIAGNOSIS — R51.9 HEADACHE, UNSPECIFIED: ICD-10-CM

## 2023-12-21 DIAGNOSIS — Z87.81 PERSONAL HISTORY OF (HEALED) TRAUMATIC FRACTURE: Chronic | ICD-10-CM

## 2023-12-21 DIAGNOSIS — M95.2 OTHER ACQUIRED DEFORMITY OF HEAD: ICD-10-CM

## 2023-12-21 PROCEDURE — 99204 OFFICE O/P NEW MOD 45 MIN: CPT

## 2023-12-21 PROCEDURE — 70551 MRI BRAIN STEM W/O DYE: CPT

## 2023-12-21 PROCEDURE — 70551 MRI BRAIN STEM W/O DYE: CPT | Mod: 26

## 2023-12-21 RX ORDER — IBUPROFEN 800 MG/1
800 TABLET, FILM COATED ORAL
Qty: 30 | Refills: 0 | Status: DISCONTINUED | COMMUNITY
Start: 2020-07-23 | End: 2023-12-21

## 2023-12-21 NOTE — PHYSICAL EXAM
[General Appearance - Alert] : alert [General Appearance - In No Acute Distress] : in no acute distress [General Appearance - Well-Appearing] : healthy appearing [Oriented To Time, Place, And Person] : oriented to person, place, and time [Impaired Insight] : insight and judgment were intact [Affect] : the affect was normal [Memory Recent] : recent memory was not impaired [Sclera] : the sclera and conjunctiva were normal [Neck Appearance] : the appearance of the neck was normal [] : no respiratory distress [Respiration, Rhythm And Depth] : normal respiratory rhythm and effort [Abnormal Walk] : normal gait [Skin Color & Pigmentation] : normal skin color and pigmentation

## 2023-12-22 ENCOUNTER — APPOINTMENT (OUTPATIENT)
Dept: MRI IMAGING | Facility: CLINIC | Age: 33
End: 2023-12-22

## 2023-12-22 NOTE — HISTORY OF PRESENT ILLNESS
[de-identified] : 32 y/o male, former smoker, with PMHx of anxiety, substance use disorder, skull fx @ 3 weeks old which he followed with until age 7 with Dr. Roland Ornelas, chronic headaches, who was recently found to have right parietal cystic lesion after he recently went to OSH ER for severe headache.   - Went to ER due to severe headache. Endorses he had CTH done which showed Right parietal cystic lesion measuring 3.8 cm x 4.1 cm x 4.3 cm with associated adjacent bony remodeling of the left parietal bone. - He referred to see ophthalmologist and endorses thinning of optic nerve and visual field testing with lower field deficits. - He went back to Dr. Ornelas for eval who recommended fenestration of cyst and shunting  Presents today for second opinion: Completed MRI w/o contrast done this morning. Notes chronic headaches, worse over the past month. With pain at prior incision, described as pulsating at times to touch.  Also c/o right forehead and cheek numbness, along with pressure pulsing behind his right eye.  Also notes left sided fingertip numbness that is worse at night. Will wake up feeling whole left arm numb.  Also with neck and low back pain. Works in manual labor

## 2023-12-22 NOTE — ASSESSMENT
[FreeTextEntry1] : 33M with pmhx of skull fx and tbi at 3 weeks presents for chronic headaches and found to have right parietal cystic area under area of previous trauma. No evidence for progression in size. no papilledema on recent ophthalmologic exam. No LP. Recommend establishment of elevated ICP. MRV to rule out pseudotumor like phenomenon. Consider serial imaging vs. LP. Recommend evaluation for papilledema evaluation and formal ophthalmologic evaluation. No surgical intervention indicated at this time. Favor chronic finding.   Dr. D'Amico independently reviewed all available images with patient.   PLAN: - MRV - Obtain opth records to evaluate if papilledema - Neurology referral - RTC after MRV to review    Patient verbalizes understanding of today's discussion and next steps in treatment plan.     A total of 45 minutes was spent reviewing the labs, imaging and physical examination of the patient. We discussed the diagnosis, and the plan. The patient's questions were answered. The patient demonstrated an excellent understanding of the plan.

## 2023-12-26 ENCOUNTER — TRANSCRIPTION ENCOUNTER (OUTPATIENT)
Age: 33
End: 2023-12-26

## 2023-12-26 NOTE — ASU PATIENT PROFILE, ADULT - FALL HARM RISK - UNIVERSAL INTERVENTIONS
Bed in lowest position, wheels locked, appropriate side rails in place/Call bell, personal items and telephone in reach/Instruct patient to call for assistance before getting out of bed or chair/Non-slip footwear when patient is out of bed/[place] to call system/Physically safe environment - no spills, clutter or unnecessary equipment/Purposeful Proactive Rounding/Room/bathroom lighting operational, light cord in reach Bed in lowest position, wheels locked, appropriate side rails in place/Call bell, personal items and telephone in reach/Instruct patient to call for assistance before getting out of bed or chair/Non-slip footwear when patient is out of bed/[place] to call system/Physically safe environment - no spills, clutter or unnecessary equipment/Purposeful Proactive Rounding/Room/bathroom lighting operational, light cord in reach

## 2023-12-26 NOTE — ASU PATIENT PROFILE, ADULT - NSICDXPASTMEDICALHX_GEN_ALL_CORE_FT
PAST MEDICAL HISTORY:  Congenital cerebral cyst     No pertinent past medical history     Skull fracture

## 2023-12-27 ENCOUNTER — INPATIENT (INPATIENT)
Facility: HOSPITAL | Age: 33
LOS: 1 days | Discharge: HOME | End: 2023-12-29
Attending: NEUROLOGICAL SURGERY | Admitting: NEUROLOGICAL SURGERY
Payer: COMMERCIAL

## 2023-12-27 ENCOUNTER — TRANSCRIPTION ENCOUNTER (OUTPATIENT)
Age: 33
End: 2023-12-27

## 2023-12-27 VITALS
RESPIRATION RATE: 16 BRPM | OXYGEN SATURATION: 98 % | DIASTOLIC BLOOD PRESSURE: 64 MMHG | WEIGHT: 175.05 LBS | HEART RATE: 59 BPM | HEIGHT: 68 IN | TEMPERATURE: 98 F | SYSTOLIC BLOOD PRESSURE: 116 MMHG

## 2023-12-27 DIAGNOSIS — Z98.890 OTHER SPECIFIED POSTPROCEDURAL STATES: Chronic | ICD-10-CM

## 2023-12-27 DIAGNOSIS — Q04.6 CONGENITAL CEREBRAL CYSTS: ICD-10-CM

## 2023-12-27 DIAGNOSIS — Z87.81 PERSONAL HISTORY OF (HEALED) TRAUMATIC FRACTURE: Chronic | ICD-10-CM

## 2023-12-27 LAB
ALBUMIN SERPL ELPH-MCNC: 4.1 G/DL — SIGNIFICANT CHANGE UP (ref 3.3–5)
ALBUMIN SERPL ELPH-MCNC: 4.1 G/DL — SIGNIFICANT CHANGE UP (ref 3.3–5)
ALP SERPL-CCNC: 76 U/L — SIGNIFICANT CHANGE UP (ref 40–120)
ALP SERPL-CCNC: 76 U/L — SIGNIFICANT CHANGE UP (ref 40–120)
ALT FLD-CCNC: 13 U/L — SIGNIFICANT CHANGE UP (ref 4–41)
ALT FLD-CCNC: 13 U/L — SIGNIFICANT CHANGE UP (ref 4–41)
ANION GAP SERPL CALC-SCNC: 12 MMOL/L — SIGNIFICANT CHANGE UP (ref 7–14)
ANION GAP SERPL CALC-SCNC: 12 MMOL/L — SIGNIFICANT CHANGE UP (ref 7–14)
AST SERPL-CCNC: 19 U/L — SIGNIFICANT CHANGE UP (ref 4–40)
AST SERPL-CCNC: 19 U/L — SIGNIFICANT CHANGE UP (ref 4–40)
BILIRUB SERPL-MCNC: 0.3 MG/DL — SIGNIFICANT CHANGE UP (ref 0.2–1.2)
BILIRUB SERPL-MCNC: 0.3 MG/DL — SIGNIFICANT CHANGE UP (ref 0.2–1.2)
BUN SERPL-MCNC: 17 MG/DL — SIGNIFICANT CHANGE UP (ref 7–23)
BUN SERPL-MCNC: 17 MG/DL — SIGNIFICANT CHANGE UP (ref 7–23)
CALCIUM SERPL-MCNC: 9.4 MG/DL — SIGNIFICANT CHANGE UP (ref 8.4–10.5)
CALCIUM SERPL-MCNC: 9.4 MG/DL — SIGNIFICANT CHANGE UP (ref 8.4–10.5)
CHLORIDE SERPL-SCNC: 103 MMOL/L — SIGNIFICANT CHANGE UP (ref 98–107)
CHLORIDE SERPL-SCNC: 103 MMOL/L — SIGNIFICANT CHANGE UP (ref 98–107)
CO2 SERPL-SCNC: 24 MMOL/L — SIGNIFICANT CHANGE UP (ref 22–31)
CO2 SERPL-SCNC: 24 MMOL/L — SIGNIFICANT CHANGE UP (ref 22–31)
CREAT SERPL-MCNC: 0.92 MG/DL — SIGNIFICANT CHANGE UP (ref 0.5–1.3)
CREAT SERPL-MCNC: 0.92 MG/DL — SIGNIFICANT CHANGE UP (ref 0.5–1.3)
EGFR: 114 ML/MIN/1.73M2 — SIGNIFICANT CHANGE UP
EGFR: 114 ML/MIN/1.73M2 — SIGNIFICANT CHANGE UP
GLUCOSE SERPL-MCNC: 118 MG/DL — HIGH (ref 70–99)
GLUCOSE SERPL-MCNC: 118 MG/DL — HIGH (ref 70–99)
HCT VFR BLD CALC: 44.9 % — SIGNIFICANT CHANGE UP (ref 39–50)
HCT VFR BLD CALC: 44.9 % — SIGNIFICANT CHANGE UP (ref 39–50)
HGB BLD-MCNC: 15.5 G/DL — SIGNIFICANT CHANGE UP (ref 13–17)
HGB BLD-MCNC: 15.5 G/DL — SIGNIFICANT CHANGE UP (ref 13–17)
MAGNESIUM SERPL-MCNC: 1.9 MG/DL — SIGNIFICANT CHANGE UP (ref 1.6–2.6)
MAGNESIUM SERPL-MCNC: 1.9 MG/DL — SIGNIFICANT CHANGE UP (ref 1.6–2.6)
MCHC RBC-ENTMCNC: 30.5 PG — SIGNIFICANT CHANGE UP (ref 27–34)
MCHC RBC-ENTMCNC: 30.5 PG — SIGNIFICANT CHANGE UP (ref 27–34)
MCHC RBC-ENTMCNC: 34.5 GM/DL — SIGNIFICANT CHANGE UP (ref 32–36)
MCHC RBC-ENTMCNC: 34.5 GM/DL — SIGNIFICANT CHANGE UP (ref 32–36)
MCV RBC AUTO: 88.2 FL — SIGNIFICANT CHANGE UP (ref 80–100)
MCV RBC AUTO: 88.2 FL — SIGNIFICANT CHANGE UP (ref 80–100)
NRBC # BLD: 0 /100 WBCS — SIGNIFICANT CHANGE UP (ref 0–0)
NRBC # BLD: 0 /100 WBCS — SIGNIFICANT CHANGE UP (ref 0–0)
NRBC # FLD: 0 K/UL — SIGNIFICANT CHANGE UP (ref 0–0)
NRBC # FLD: 0 K/UL — SIGNIFICANT CHANGE UP (ref 0–0)
PHOSPHATE SERPL-MCNC: 2.6 MG/DL — SIGNIFICANT CHANGE UP (ref 2.5–4.5)
PHOSPHATE SERPL-MCNC: 2.6 MG/DL — SIGNIFICANT CHANGE UP (ref 2.5–4.5)
PLATELET # BLD AUTO: 167 K/UL — SIGNIFICANT CHANGE UP (ref 150–400)
PLATELET # BLD AUTO: 167 K/UL — SIGNIFICANT CHANGE UP (ref 150–400)
POTASSIUM SERPL-MCNC: 4.3 MMOL/L — SIGNIFICANT CHANGE UP (ref 3.5–5.3)
POTASSIUM SERPL-MCNC: 4.3 MMOL/L — SIGNIFICANT CHANGE UP (ref 3.5–5.3)
POTASSIUM SERPL-SCNC: 4.3 MMOL/L — SIGNIFICANT CHANGE UP (ref 3.5–5.3)
POTASSIUM SERPL-SCNC: 4.3 MMOL/L — SIGNIFICANT CHANGE UP (ref 3.5–5.3)
PROT SERPL-MCNC: 6.9 G/DL — SIGNIFICANT CHANGE UP (ref 6–8.3)
PROT SERPL-MCNC: 6.9 G/DL — SIGNIFICANT CHANGE UP (ref 6–8.3)
RBC # BLD: 5.09 M/UL — SIGNIFICANT CHANGE UP (ref 4.2–5.8)
RBC # BLD: 5.09 M/UL — SIGNIFICANT CHANGE UP (ref 4.2–5.8)
RBC # FLD: 11.6 % — SIGNIFICANT CHANGE UP (ref 10.3–14.5)
RBC # FLD: 11.6 % — SIGNIFICANT CHANGE UP (ref 10.3–14.5)
SODIUM SERPL-SCNC: 139 MMOL/L — SIGNIFICANT CHANGE UP (ref 135–145)
SODIUM SERPL-SCNC: 139 MMOL/L — SIGNIFICANT CHANGE UP (ref 135–145)
WBC # BLD: 4.59 K/UL — SIGNIFICANT CHANGE UP (ref 3.8–10.5)
WBC # BLD: 4.59 K/UL — SIGNIFICANT CHANGE UP (ref 3.8–10.5)
WBC # FLD AUTO: 4.59 K/UL — SIGNIFICANT CHANGE UP (ref 3.8–10.5)
WBC # FLD AUTO: 4.59 K/UL — SIGNIFICANT CHANGE UP (ref 3.8–10.5)

## 2023-12-27 PROCEDURE — 99222 1ST HOSP IP/OBS MODERATE 55: CPT

## 2023-12-27 DEVICE — SURGIFOAM PAD 8CM X 12.5CM X 2MM (100C): Type: IMPLANTABLE DEVICE | Status: FUNCTIONAL

## 2023-12-27 DEVICE — SURGICEL 2 X 14": Type: IMPLANTABLE DEVICE | Status: FUNCTIONAL

## 2023-12-27 DEVICE — PLATE COVER BURRHOLE UN3 W/TAB 14MM: Type: IMPLANTABLE DEVICE | Status: FUNCTIONAL

## 2023-12-27 DEVICE — IMPLANTABLE DEVICE: Type: IMPLANTABLE DEVICE | Status: FUNCTIONAL

## 2023-12-27 DEVICE — BONE WAX 2.5GM: Type: IMPLANTABLE DEVICE | Status: FUNCTIONAL

## 2023-12-27 DEVICE — SURGIFLO MATRIX WITH THROMBIN KIT: Type: IMPLANTABLE DEVICE | Status: FUNCTIONAL

## 2023-12-27 DEVICE — MATRIX DURAGEN PLUS DURAL REGENERATION 3X3: Type: IMPLANTABLE DEVICE | Status: FUNCTIONAL

## 2023-12-27 DEVICE — TACHOSIL 4.8 X 4.8CM: Type: IMPLANTABLE DEVICE | Status: FUNCTIONAL

## 2023-12-27 DEVICE — PLATE BURR 14MM HOLE COVER: Type: IMPLANTABLE DEVICE | Status: FUNCTIONAL

## 2023-12-27 DEVICE — SCREW UN3 AXS SELF DRILL 1.5X4MM: Type: IMPLANTABLE DEVICE | Status: FUNCTIONAL

## 2023-12-27 DEVICE — PLATE UN3 STRAIGHT 16 HOLE: Type: IMPLANTABLE DEVICE | Status: FUNCTIONAL

## 2023-12-27 DEVICE — PLATE UN3 2 HOLE: Type: IMPLANTABLE DEVICE | Status: FUNCTIONAL

## 2023-12-27 DEVICE — MAYFIELD SKULL PIN ADULT PLASTIC: Type: IMPLANTABLE DEVICE | Status: FUNCTIONAL

## 2023-12-27 RX ORDER — ONDANSETRON 8 MG/1
4 TABLET, FILM COATED ORAL ONCE
Refills: 0 | Status: COMPLETED | OUTPATIENT
Start: 2023-12-27 | End: 2023-12-27

## 2023-12-27 RX ORDER — OXYCODONE HYDROCHLORIDE 5 MG/1
10 TABLET ORAL EVERY 4 HOURS
Refills: 0 | Status: DISCONTINUED | OUTPATIENT
Start: 2023-12-27 | End: 2023-12-29

## 2023-12-27 RX ORDER — SENNA PLUS 8.6 MG/1
2 TABLET ORAL AT BEDTIME
Refills: 0 | Status: DISCONTINUED | OUTPATIENT
Start: 2023-12-27 | End: 2023-12-29

## 2023-12-27 RX ORDER — INFLUENZA VIRUS VACCINE 15; 15; 15; 15 UG/.5ML; UG/.5ML; UG/.5ML; UG/.5ML
0.5 SUSPENSION INTRAMUSCULAR ONCE
Refills: 0 | Status: DISCONTINUED | OUTPATIENT
Start: 2023-12-27 | End: 2023-12-29

## 2023-12-27 RX ORDER — SODIUM CHLORIDE 9 MG/ML
1000 INJECTION INTRAMUSCULAR; INTRAVENOUS; SUBCUTANEOUS
Refills: 0 | Status: DISCONTINUED | OUTPATIENT
Start: 2023-12-27 | End: 2023-12-27

## 2023-12-27 RX ORDER — SODIUM CHLORIDE 9 MG/ML
1000 INJECTION, SOLUTION INTRAVENOUS
Refills: 0 | Status: DISCONTINUED | OUTPATIENT
Start: 2023-12-27 | End: 2023-12-27

## 2023-12-27 RX ORDER — OXYCODONE HYDROCHLORIDE 5 MG/1
5 TABLET ORAL EVERY 4 HOURS
Refills: 0 | Status: DISCONTINUED | OUTPATIENT
Start: 2023-12-27 | End: 2023-12-29

## 2023-12-27 RX ORDER — ACETAMINOPHEN 500 MG
1000 TABLET ORAL EVERY 6 HOURS
Refills: 0 | Status: ACTIVE | OUTPATIENT
Start: 2023-12-27 | End: 2023-12-28

## 2023-12-27 RX ORDER — CHLORHEXIDINE GLUCONATE 213 G/1000ML
1 SOLUTION TOPICAL DAILY
Refills: 0 | Status: DISCONTINUED | OUTPATIENT
Start: 2023-12-27 | End: 2023-12-28

## 2023-12-27 RX ORDER — DEXAMETHASONE 0.5 MG/5ML
4 ELIXIR ORAL EVERY 6 HOURS
Refills: 0 | Status: DISCONTINUED | OUTPATIENT
Start: 2023-12-27 | End: 2023-12-27

## 2023-12-27 RX ORDER — POLYETHYLENE GLYCOL 3350 17 G/17G
17 POWDER, FOR SOLUTION ORAL DAILY
Refills: 0 | Status: DISCONTINUED | OUTPATIENT
Start: 2023-12-27 | End: 2023-12-29

## 2023-12-27 RX ORDER — HYDROMORPHONE HYDROCHLORIDE 2 MG/ML
0.5 INJECTION INTRAMUSCULAR; INTRAVENOUS; SUBCUTANEOUS EVERY 6 HOURS
Refills: 0 | Status: DISCONTINUED | OUTPATIENT
Start: 2023-12-27 | End: 2023-12-28

## 2023-12-27 RX ORDER — LEVETIRACETAM 250 MG/1
1500 TABLET, FILM COATED ORAL EVERY 12 HOURS
Refills: 0 | Status: DISCONTINUED | OUTPATIENT
Start: 2023-12-27 | End: 2023-12-27

## 2023-12-27 RX ORDER — LEVETIRACETAM 250 MG/1
1500 TABLET, FILM COATED ORAL EVERY 12 HOURS
Refills: 0 | Status: DISCONTINUED | OUTPATIENT
Start: 2023-12-27 | End: 2023-12-29

## 2023-12-27 RX ORDER — CEFAZOLIN SODIUM 1 G
2000 VIAL (EA) INJECTION EVERY 8 HOURS
Refills: 0 | Status: COMPLETED | OUTPATIENT
Start: 2023-12-27 | End: 2023-12-27

## 2023-12-27 RX ORDER — PANTOPRAZOLE SODIUM 20 MG/1
40 TABLET, DELAYED RELEASE ORAL
Refills: 0 | Status: DISCONTINUED | OUTPATIENT
Start: 2023-12-27 | End: 2023-12-28

## 2023-12-27 RX ORDER — DEXAMETHASONE 0.5 MG/5ML
4 ELIXIR ORAL EVERY 6 HOURS
Refills: 0 | Status: DISCONTINUED | OUTPATIENT
Start: 2023-12-27 | End: 2023-12-29

## 2023-12-27 RX ADMIN — Medication 1000 MILLIGRAM(S): at 15:53

## 2023-12-27 RX ADMIN — HYDROMORPHONE HYDROCHLORIDE 0.5 MILLIGRAM(S): 2 INJECTION INTRAMUSCULAR; INTRAVENOUS; SUBCUTANEOUS at 20:54

## 2023-12-27 RX ADMIN — Medication 4 MILLIGRAM(S): at 23:12

## 2023-12-27 RX ADMIN — Medication 100 MILLIGRAM(S): at 23:12

## 2023-12-27 RX ADMIN — ONDANSETRON 4 MILLIGRAM(S): 8 TABLET, FILM COATED ORAL at 16:50

## 2023-12-27 RX ADMIN — OXYCODONE HYDROCHLORIDE 5 MILLIGRAM(S): 5 TABLET ORAL at 17:05

## 2023-12-27 RX ADMIN — Medication 400 MILLIGRAM(S): at 22:19

## 2023-12-27 RX ADMIN — LEVETIRACETAM 1500 MILLIGRAM(S): 250 TABLET, FILM COATED ORAL at 18:16

## 2023-12-27 RX ADMIN — Medication 4 MILLIGRAM(S): at 18:16

## 2023-12-27 RX ADMIN — Medication 400 MILLIGRAM(S): at 15:23

## 2023-12-27 RX ADMIN — Medication 100 MILLIGRAM(S): at 17:34

## 2023-12-27 RX ADMIN — ONDANSETRON 4 MILLIGRAM(S): 8 TABLET, FILM COATED ORAL at 23:31

## 2023-12-27 RX ADMIN — OXYCODONE HYDROCHLORIDE 5 MILLIGRAM(S): 5 TABLET ORAL at 16:50

## 2023-12-27 RX ADMIN — Medication 1000 MILLIGRAM(S): at 22:00

## 2023-12-27 RX ADMIN — HYDROMORPHONE HYDROCHLORIDE 0.5 MILLIGRAM(S): 2 INJECTION INTRAMUSCULAR; INTRAVENOUS; SUBCUTANEOUS at 20:21

## 2023-12-27 NOTE — CONSULT NOTE ADULT - ASSESSMENT
ASSESSMENT: 33M who presented to [facility] for a scheduled Stereotactic Fenestration of Right Posterior Parietal Cyst with Dr Ornelas. Patient has a history of a fractured skull (3 weeks old). He presented to the ED at [facility] a few weeks ago with complaints of headache, loss of peripheral vision, numbness and tingling. At that time, imaging demonstrated a cerebral cyst. Patient now s/p right craniotomy for fenestration of cyst into lateral ventricle. Intraoperatively, patient had a seizure, responded to Propofol and Precedex. CTH postoperatively was unremkarble. SICU consulted postoperatively for neuro checks.     PLAN:   Neurologic:  -S/p R craniotomy with cyst fenestration c/b seizure intraoperatively   -Keppra 1500 BID   -Pain medications: Tylenol and Oxycodone   -Q1 neuro checks   -Repeat CTH in AM     Respiratory:  -Maintain O2 saturation >92%    Cardiovascular:  -MAP goal >65  -Pressors: None     Gastrointestinal/Nutrition:  -Diet: Advance as tolerated   -Protonix QD     Genitourinary/Renal:  -IVF: NS @ 75   -Monitor strict I/Os     Hematologic:  -DVT ppx: Holding     Infectious Disease:  -Abx: Ancef   -Monitor WBC   -Monitor fever curve     Endocrine:  -Monitor blood glucose   -Dexamethasone 4 mg q 6 hours     Disposition: SICU  ASSESSMENT: 33M who presented to [facility] for a scheduled Stereotactic Fenestration of Right Posterior Parietal Cyst with Dr Ornelas. Patient has a history of a fractured skull (3 weeks old). He presented to the ED at [facility] a few weeks ago with complaints of headache, loss of peripheral vision, numbness and tingling. At that time, imaging demonstrated a cerebral cyst. Patient now s/p right craniotomy for fenestration of cyst into lateral ventricle. Intraoperatively, patient had a seizure, responded to Propofol and Precedex. CTH postoperatively was unremkarble. SICU consulted postoperatively for neuro checks.     PLAN:   Neurologic:  -S/p R craniotomy with cyst fenestration c/b seizure intraoperatively   -Keppra 1500 BID   -Pain medications: Tylenol and Oxycodone   -Q1 neuro checks   -Repeat CTH in AM     Respiratory:  -Maintain O2 saturation >92%    Cardiovascular:  -MAP goal >65  -Pressors: None     Gastrointestinal/Nutrition:  -Diet: Advance as tolerated   -Protonix QD     Genitourinary/Renal:  -IVF: NS @ 75   -Monitor strict I/Os     Hematologic:  -DVT ppx: Holding     Infectious Disease:  -Abx: Ancef   -Monitor WBC   -Monitor fever curve     Endocrine:  -Monitor blood glucose   -Dexamethasone 4 mg q 6 hours     Disposition: SICU

## 2023-12-27 NOTE — PROVIDER CONTACT NOTE (OTHER) - ASSESSMENT
Patient complaining of headache & vomit, no relief from tylenol. Then 5mg Oxy given. Patient with no relief of pain. Patient with liquid emesis. No relief of headache/nausea post emesis. PO meds changed to IV. Neuro exam intact. Patient with frequent yawning.

## 2023-12-27 NOTE — PROVIDER CONTACT NOTE (OTHER) - SITUATION
Patient complaining of headache & vomit, no relief from tylenol. 5mg Oxy given. Patient with no relieft with

## 2023-12-27 NOTE — CONSULT NOTE ADULT - ATTENDING COMMENTS
I agree with the detailed interval history, physical, and plan, which I have reviewed and edited where appropriate'; also agree with notes/assessment with my team on service.  I have personally examined the patient.  I was physically present for the key portions of the evaluation and management (E/M) service provided.  I reviewed all the pertinent data.  The patient is a critical care patient with life threatening hemodynamic and metabolic instability in SICU.  The SICU team has a constant risk benefit analyzes discussion and coordinating care with the primary team and all consultants.   The patient is in SICU with the chief complaint and diagnosis mentioned in the note.   The plan will be specified in the note.  33M s/p right craniotomy for fenestration of cyst into lateral ventricle; s/p R craniotomy with cyst fenestration c/b seizure intraoperatively . SICU consulted postoperatively for neuro checks and HD monitoring.   PHYSICAL EXAM:  Gen: NAD  HEENT: SG drain-serosanginous  Resp: RA  Cardiac: RR  Abd: soft, nondistended    PLAN:   Neurologic:  -Keppra   -Tylenol   - Oxycodone   -Q1 neuro checks   -Repeat CTH in AM   Respiratory:  -RA  Cardiovascular:  -MAP goal >65  Gastrointestinal/Nutrition:  -Diet: Advance as tolerated   -Protonix   Genitourinary/Renal:  -IVF: NS @ 75   Hematologic:  -DVT ppx: Hold   Infectious Disease:  -Ancef   -Monitor WBC   Endocrine:  -Monitor glucose   -Dexamethasone     Disposition: SICU

## 2023-12-27 NOTE — PATIENT PROFILE ADULT - FALL HARM RISK - HARM RISK INTERVENTIONS
Assistance with ambulation/Assistance OOB with selected safe patient handling equipment/Communicate Risk of Fall with Harm to all staff/Monitor gait and stability/Reinforce activity limits and safety measures with patient and family/Sit up slowly, dangle for a short time, stand at bedside before walking/Tailored Fall Risk Interventions/Use of alarms - bed, chair and/or voice tab/Visual Cue: Yellow wristband and red socks/Bed in lowest position, wheels locked, appropriate side rails in place/Call bell, personal items and telephone in reach/Instruct patient to call for assistance before getting out of bed or chair/Non-slip footwear when patient is out of bed/[place] to call system/Physically safe environment - no spills, clutter or unnecessary equipment/Purposeful Proactive Rounding/Room/bathroom lighting operational, light cord in reach Assistance with ambulation/Assistance OOB with selected safe patient handling equipment/Communicate Risk of Fall with Harm to all staff/Monitor gait and stability/Reinforce activity limits and safety measures with patient and family/Sit up slowly, dangle for a short time, stand at bedside before walking/Tailored Fall Risk Interventions/Use of alarms - bed, chair and/or voice tab/Visual Cue: Yellow wristband and red socks/Bed in lowest position, wheels locked, appropriate side rails in place/Call bell, personal items and telephone in reach/Instruct patient to call for assistance before getting out of bed or chair/Non-slip footwear when patient is out of bed/[place] to call system/Physically safe environment - no spills, clutter or unnecessary equipment/Purposeful Proactive Rounding/Room/bathroom lighting operational, light cord in reach

## 2023-12-27 NOTE — BRIEF OPERATIVE NOTE - OPERATION/FINDINGS
Right craniotomy for fenestration of cyst into lateral ventricle. 1 SG SHERYL left. Closed with ebony

## 2023-12-27 NOTE — CONSULT NOTE ADULT - SUBJECTIVE AND OBJECTIVE BOX
SURGERY CONSULT NOTE     HISTORY OF PRESENT ILLNESS:  HPI: 33M who presented to [facility] for a scheduled Stereotactic Fenestration of Right Posterior Parietal Cyst with Dr Ornelas. Patient has a history of a fractured skull (3 weeks old). He presented to the ED at [facility] a few weeks ago with complaints of headache, loss of peripheral vision, numbness and tingling. At that time, imaging demonstrated a cerebral cyst. Patient now s/p right craniotomy for fenestration of cyst into lateral ventricle. Intraoperatively, patient had a seizure, responded to Propofol and Precedex. CTH postoperatively was unremkarble. SICU consulted postoperatively for neuro checks.     PAST MEDICAL HISTORY: No pertinent past medical history    Skull fracture    Congenital cerebral cyst    PAST SURGICAL HISTORY: H/O knee surgery    History of fracture of skull    ALLERGIES: No Known Allergies      VITAL SIGNS:  ICU Vital Signs Last 24 Hrs  T(C): 36.8 (27 Dec 2023 05:31), Max: 36.8 (27 Dec 2023 05:31)  T(F): 98.2 (27 Dec 2023 05:31), Max: 98.2 (27 Dec 2023 05:31)  HR: 59 (27 Dec 2023 05:31) (59 - 59)  BP: 116/64 (27 Dec 2023 05:31) (116/64 - 116/64)  BP(mean): --  ABP: --  ABP(mean): --  RR: 16 (27 Dec 2023 05:31) (16 - 16)  SpO2: 98% (27 Dec 2023 05:31) (98% - 98%)    PHYSICAL EXAM:  Gen: NAD  HEENT: SG drain in place, serosanginous output   Resp: no increased WOB, satting well on RA  Cardiac: appears well perfused, extremities warm  Abd: soft, nondistended   SURGERY CONSULT NOTE     HISTORY OF PRESENT ILLNESS:  HPI: 33M who presented to [facility] for a scheduled Stereotactic Fenestration of Right Posterior Parietal Cyst with Dr Ornelas. Patient has a history of a fractured skull (3 weeks old). He presented to the ED at [facility] a few weeks ago with complaints of headache, loss of peripheral vision, numbness and tingling. At that time, imaging demonstrated a cerebral cyst. Patient now s/p right craniotomy for fenestration of cyst into lateral ventricle. Intraoperatively, patient had a seizure, responded to Propofol and Precedex. CTH postoperatively was unremkarble. SICU consulted postoperatively for neuro checks.     PAST MEDICAL HISTORY: No pertinent past medical history    Skull fracture    Congenital cerebral cyst    PAST SURGICAL HISTORY: H/O knee surgery    History of fracture of skull    ALLERGIES: No Known Allergies      VITAL SIGNS:  ICU Vital Signs Last 24 Hrs  T(C): 36.8 (27 Dec 2023 05:31), Max: 36.8 (27 Dec 2023 05:31)  T(F): 98.2 (27 Dec 2023 05:31), Max: 98.2 (27 Dec 2023 05:31)  HR: 59 (27 Dec 2023 05:31) (59 - 59)  BP: 116/64 (27 Dec 2023 05:31) (116/64 - 116/64)  BP(mean): --  ABP: --  ABP(mean): --  RR: 16 (27 Dec 2023 05:31) (16 - 16)  SpO2: 98% (27 Dec 2023 05:31) (98% - 98%)    PHYSICAL EXAM:  Gen: NAD  HEENT: SG drain in place, serosanginous output   Resp: no increased WOB, satting well on RA  Cardiac: appears well perfused, extremities warm  Abd: soft, nondistended

## 2023-12-27 NOTE — PATIENT PROFILE ADULT - WHEN WAS YOUR LAST VACCINATION? YEAR
acute on chronic ITP  s/p 1 unit Plts, s/p IVIG, s/p Dexamethasone, was on prednisone 1mg/kg 150 mg.  Held for ? steroid induced psychosis.  Platelets now normal. check daily off steroids  platelets dropped by 50%- d/w heme now rechallenging w/ prednisone based on ideal body weight- cont to trend  d/w mother 1/29 about her concerns about VTE prophylaxis after d/w heme fellow- ideally lovenox preferred but pt's mother would prefer to hold off d/t ITP.  pt is ambulating in the room. 2022

## 2023-12-28 DIAGNOSIS — Z98.890 OTHER SPECIFIED POSTPROCEDURAL STATES: ICD-10-CM

## 2023-12-28 LAB
ANION GAP SERPL CALC-SCNC: 13 MMOL/L — SIGNIFICANT CHANGE UP (ref 7–14)
ANION GAP SERPL CALC-SCNC: 13 MMOL/L — SIGNIFICANT CHANGE UP (ref 7–14)
BUN SERPL-MCNC: 20 MG/DL — SIGNIFICANT CHANGE UP (ref 7–23)
BUN SERPL-MCNC: 20 MG/DL — SIGNIFICANT CHANGE UP (ref 7–23)
CALCIUM SERPL-MCNC: 9.5 MG/DL — SIGNIFICANT CHANGE UP (ref 8.4–10.5)
CALCIUM SERPL-MCNC: 9.5 MG/DL — SIGNIFICANT CHANGE UP (ref 8.4–10.5)
CHLORIDE SERPL-SCNC: 103 MMOL/L — SIGNIFICANT CHANGE UP (ref 98–107)
CHLORIDE SERPL-SCNC: 103 MMOL/L — SIGNIFICANT CHANGE UP (ref 98–107)
CO2 SERPL-SCNC: 24 MMOL/L — SIGNIFICANT CHANGE UP (ref 22–31)
CO2 SERPL-SCNC: 24 MMOL/L — SIGNIFICANT CHANGE UP (ref 22–31)
CREAT SERPL-MCNC: 0.77 MG/DL — SIGNIFICANT CHANGE UP (ref 0.5–1.3)
CREAT SERPL-MCNC: 0.77 MG/DL — SIGNIFICANT CHANGE UP (ref 0.5–1.3)
EGFR: 123 ML/MIN/1.73M2 — SIGNIFICANT CHANGE UP
EGFR: 123 ML/MIN/1.73M2 — SIGNIFICANT CHANGE UP
GLUCOSE SERPL-MCNC: 151 MG/DL — HIGH (ref 70–99)
GLUCOSE SERPL-MCNC: 151 MG/DL — HIGH (ref 70–99)
HCT VFR BLD CALC: 42.2 % — SIGNIFICANT CHANGE UP (ref 39–50)
HCT VFR BLD CALC: 42.2 % — SIGNIFICANT CHANGE UP (ref 39–50)
HGB BLD-MCNC: 14.5 G/DL — SIGNIFICANT CHANGE UP (ref 13–17)
HGB BLD-MCNC: 14.5 G/DL — SIGNIFICANT CHANGE UP (ref 13–17)
MAGNESIUM SERPL-MCNC: 1.8 MG/DL — SIGNIFICANT CHANGE UP (ref 1.6–2.6)
MAGNESIUM SERPL-MCNC: 1.8 MG/DL — SIGNIFICANT CHANGE UP (ref 1.6–2.6)
MCHC RBC-ENTMCNC: 30.3 PG — SIGNIFICANT CHANGE UP (ref 27–34)
MCHC RBC-ENTMCNC: 30.3 PG — SIGNIFICANT CHANGE UP (ref 27–34)
MCHC RBC-ENTMCNC: 34.4 GM/DL — SIGNIFICANT CHANGE UP (ref 32–36)
MCHC RBC-ENTMCNC: 34.4 GM/DL — SIGNIFICANT CHANGE UP (ref 32–36)
MCV RBC AUTO: 88.1 FL — SIGNIFICANT CHANGE UP (ref 80–100)
MCV RBC AUTO: 88.1 FL — SIGNIFICANT CHANGE UP (ref 80–100)
NRBC # BLD: 0 /100 WBCS — SIGNIFICANT CHANGE UP (ref 0–0)
NRBC # BLD: 0 /100 WBCS — SIGNIFICANT CHANGE UP (ref 0–0)
NRBC # FLD: 0 K/UL — SIGNIFICANT CHANGE UP (ref 0–0)
NRBC # FLD: 0 K/UL — SIGNIFICANT CHANGE UP (ref 0–0)
PHOSPHATE SERPL-MCNC: 3.1 MG/DL — SIGNIFICANT CHANGE UP (ref 2.5–4.5)
PHOSPHATE SERPL-MCNC: 3.1 MG/DL — SIGNIFICANT CHANGE UP (ref 2.5–4.5)
PLATELET # BLD AUTO: 165 K/UL — SIGNIFICANT CHANGE UP (ref 150–400)
PLATELET # BLD AUTO: 165 K/UL — SIGNIFICANT CHANGE UP (ref 150–400)
POTASSIUM SERPL-MCNC: 3.9 MMOL/L — SIGNIFICANT CHANGE UP (ref 3.5–5.3)
POTASSIUM SERPL-MCNC: 3.9 MMOL/L — SIGNIFICANT CHANGE UP (ref 3.5–5.3)
POTASSIUM SERPL-SCNC: 3.9 MMOL/L — SIGNIFICANT CHANGE UP (ref 3.5–5.3)
POTASSIUM SERPL-SCNC: 3.9 MMOL/L — SIGNIFICANT CHANGE UP (ref 3.5–5.3)
RBC # BLD: 4.79 M/UL — SIGNIFICANT CHANGE UP (ref 4.2–5.8)
RBC # BLD: 4.79 M/UL — SIGNIFICANT CHANGE UP (ref 4.2–5.8)
RBC # FLD: 11.9 % — SIGNIFICANT CHANGE UP (ref 10.3–14.5)
RBC # FLD: 11.9 % — SIGNIFICANT CHANGE UP (ref 10.3–14.5)
SODIUM SERPL-SCNC: 140 MMOL/L — SIGNIFICANT CHANGE UP (ref 135–145)
SODIUM SERPL-SCNC: 140 MMOL/L — SIGNIFICANT CHANGE UP (ref 135–145)
WBC # BLD: 8 K/UL — SIGNIFICANT CHANGE UP (ref 3.8–10.5)
WBC # BLD: 8 K/UL — SIGNIFICANT CHANGE UP (ref 3.8–10.5)
WBC # FLD AUTO: 8 K/UL — SIGNIFICANT CHANGE UP (ref 3.8–10.5)
WBC # FLD AUTO: 8 K/UL — SIGNIFICANT CHANGE UP (ref 3.8–10.5)

## 2023-12-28 PROCEDURE — 99233 SBSQ HOSP IP/OBS HIGH 50: CPT

## 2023-12-28 PROCEDURE — 70450 CT HEAD/BRAIN W/O DYE: CPT | Mod: 26

## 2023-12-28 RX ORDER — MAGNESIUM SULFATE 500 MG/ML
1 VIAL (ML) INJECTION ONCE
Refills: 0 | Status: COMPLETED | OUTPATIENT
Start: 2023-12-28 | End: 2023-12-28

## 2023-12-28 RX ORDER — ONDANSETRON 8 MG/1
4 TABLET, FILM COATED ORAL EVERY 8 HOURS
Refills: 0 | Status: DISCONTINUED | OUTPATIENT
Start: 2023-12-28 | End: 2023-12-29

## 2023-12-28 RX ADMIN — Medication 4 MILLIGRAM(S): at 11:39

## 2023-12-28 RX ADMIN — HYDROMORPHONE HYDROCHLORIDE 0.5 MILLIGRAM(S): 2 INJECTION INTRAMUSCULAR; INTRAVENOUS; SUBCUTANEOUS at 02:28

## 2023-12-28 RX ADMIN — Medication 100 GRAM(S): at 04:28

## 2023-12-28 RX ADMIN — OXYCODONE HYDROCHLORIDE 10 MILLIGRAM(S): 5 TABLET ORAL at 08:36

## 2023-12-28 RX ADMIN — HYDROMORPHONE HYDROCHLORIDE 0.5 MILLIGRAM(S): 2 INJECTION INTRAMUSCULAR; INTRAVENOUS; SUBCUTANEOUS at 03:09

## 2023-12-28 RX ADMIN — OXYCODONE HYDROCHLORIDE 10 MILLIGRAM(S): 5 TABLET ORAL at 20:55

## 2023-12-28 RX ADMIN — OXYCODONE HYDROCHLORIDE 10 MILLIGRAM(S): 5 TABLET ORAL at 17:52

## 2023-12-28 RX ADMIN — OXYCODONE HYDROCHLORIDE 10 MILLIGRAM(S): 5 TABLET ORAL at 09:06

## 2023-12-28 RX ADMIN — OXYCODONE HYDROCHLORIDE 5 MILLIGRAM(S): 5 TABLET ORAL at 12:55

## 2023-12-28 RX ADMIN — OXYCODONE HYDROCHLORIDE 10 MILLIGRAM(S): 5 TABLET ORAL at 21:50

## 2023-12-28 RX ADMIN — Medication 4 MILLIGRAM(S): at 16:52

## 2023-12-28 RX ADMIN — Medication 1000 MILLIGRAM(S): at 05:00

## 2023-12-28 RX ADMIN — SENNA PLUS 2 TABLET(S): 8.6 TABLET ORAL at 21:07

## 2023-12-28 RX ADMIN — Medication 400 MILLIGRAM(S): at 04:25

## 2023-12-28 RX ADMIN — OXYCODONE HYDROCHLORIDE 10 MILLIGRAM(S): 5 TABLET ORAL at 16:52

## 2023-12-28 RX ADMIN — LEVETIRACETAM 1500 MILLIGRAM(S): 250 TABLET, FILM COATED ORAL at 18:10

## 2023-12-28 RX ADMIN — LEVETIRACETAM 1500 MILLIGRAM(S): 250 TABLET, FILM COATED ORAL at 06:50

## 2023-12-28 RX ADMIN — Medication 4 MILLIGRAM(S): at 06:51

## 2023-12-28 NOTE — PHYSICAL THERAPY INITIAL EVALUATION ADULT - GAIT DISTANCE, PT EVAL
15 feet; unable to ambulate further due to elevated 's and feeling slightly "dizzy." MANSI Colón at bedside and aware.

## 2023-12-28 NOTE — PROGRESS NOTE ADULT - ASSESSMENT
12/28: 32 YO male stable POD # 1 S/P Right craniotomy for fenestration of cyst into lateral ventricle. SHERYL X 1, on keppra and decadron 12/28: 34 YO male stable POD # 1 S/P Right craniotomy for fenestration of cyst into lateral ventricle. SHERYL X 1, on keppra and decadron

## 2023-12-28 NOTE — PROGRESS NOTE ADULT - ASSESSMENT
33M who presented to [facility] for a scheduled Stereotactic Fenestration of Right Posterior Parietal Cyst with Dr Ornelas. Patient has a history of a fractured skull (3 weeks old). He presented to the ED at [facility] a few weeks ago with complaints of headache, loss of peripheral vision, numbness and tingling. At that time, imaging demonstrated a cerebral cyst. Patient now s/p right craniotomy for fenestration of cyst into lateral ventricle. Intraoperatively, patient had a seizure, responded to Propofol and Precedex. CTH postoperatively was unremkarble. SICU consulted postoperatively for neuro checks.       PLAN:   Neurologic:  -S/p R craniotomy with cyst fenestration c/b seizure intraoperatively   -Keppra 1500 BID   -Pain medications: Tylenol and Oxycodone   -Q1 neuro checks   -Repeat CTH in AM     Respiratory:  -Maintain O2 saturation >92%    Cardiovascular:  -MAP goal >65    Gastrointestinal/Nutrition:  -Diet: CLD, ADAT  -Protonix QD     Genitourinary/Renal:  -IVF: NS @ 75   -Monitor strict I/Os     Hematologic:  -DVT ppx: Holding   - SCDs    Infectious Disease:  -Abx: Ancef   -Monitor WBC   -Monitor fever curve     Endocrine:  -Monitor blood glucose   -Dexamethasone 4 mg q 6 hours     Disposition: SICU  33M who presented to [facility] for a scheduled Stereotactic Fenestration of Right Posterior Parietal Cyst with Dr Ornelas. Patient has a history of a fractured skull (3 weeks old). He presented to the ED at [facility] a few weeks ago with complaints of headache, loss of peripheral vision, numbness and tingling. At that time, imaging demonstrated a cerebral cyst. Patient now s/p right craniotomy for fenestration of cyst into lateral ventricle. Intraoperatively, patient had a seizure, responded to Propofol and Precedex. CTH postoperatively was unremkarble. SICU consulted postoperatively for neuro checks.       PLAN:   Neurologic:  -S/p R craniotomy with cyst fenestration c/b seizure intraoperatively   -Keppra 1500 BID   -Pain medications: Tylenol and Oxycodone   -Q1 neuro checks   -Repeat CTH in AM     Respiratory:  -Maintain O2 saturation >92%    Cardiovascular:  -MAP goal >65    Gastrointestinal/Nutrition:  -Diet: CLD, ADAT  -Protonix QD     Genitourinary/Renal:  -IVF: NS @ 75   -Monitor strict I/Os     Hematologic:  -DVT ppx: Holding   - SCDs    Infectious Disease:  -Abx: Ancef   -Monitor WBC   -Monitor fever curve     Endocrine:  -Monitor blood glucose   -Dexamethasone 4 mg q 6 hours     Disposition: SICU  33M who presented to [facility] for a scheduled Stereotactic Fenestration of Right Posterior Parietal Cyst with Dr Ornelas. Patient has a history of a fractured skull (3 weeks old). He presented to the ED at [facility] a few weeks ago with complaints of headache, loss of peripheral vision, numbness and tingling. At that time, imaging demonstrated a cerebral cyst. Patient now s/p right craniotomy for fenestration of cyst into lateral ventricle. Intraoperatively, patient had a seizure, responded to Propofol and Precedex. CTH postoperatively was unremarkable SICU consulted postoperatively for neuro checks.       PLAN:   Neurologic:  -S/p R craniotomy with cyst fenestration c/b seizure intraoperatively   -change abhijeet bulb drain to NO suction  -Keppra 1500 BID   -Pain medications: Tylenol and Oxycodone   -Q1 neuro checks   -Repeat CTH in AM performed.    Respiratory:  -Maintain O2 saturation >92%    Cardiovascular:  -MAP goal >65    Gastrointestinal/Nutrition:  -Diet: adv regular diet  -Protonix QD    Genitourinary/Renal:  -IVL  -Monitor strict I/Os     Hematologic:  -DVT ppx: Holding   - SCDs    Infectious Disease:  -Abx: Ancef complete  -Monitor WBC   -Monitor fever curve     Endocrine:  -Monitor blood glucose   -Dexamethasone 4 mg q 6 hours     Disposition: SICU  33M who presented to [facility] for a scheduled Stereotactic Fenestration of Right Posterior Parietal Cyst with Dr Ornelas. Patient has a history of a fractured skull (3 weeks old). He presented to the ED at [facility] a few weeks ago with complaints of headache, loss of peripheral vision, numbness and tingling. At that time, imaging demonstrated a cerebral cyst. Patient now s/p right craniotomy for fenestration of cyst into lateral ventricle. Intraoperatively, patient had a seizure, responded to Propofol and Precedex. CTH postoperatively was unremarkable SICU consulted postoperatively for neuro checks.       PLAN:   Neurologic:  -S/p R craniotomy with cyst fenestration c/b seizure intraoperatively   -change abhijeet bulb drain to NO suction  -Keppra 1500 BID   -Pain medications: Tylenol and Oxycodone   -Q1 neuro checks   -Repeat CTH in AM performed.    Respiratory:  -Maintain O2 saturation >92%    Cardiovascular:  -MAP goal >65    Gastrointestinal/Nutrition:  -Diet: adv regular diet  -Protonix QD    Genitourinary/Renal:  -IVL  -Monitor strict I/Os     Hematologic:  -DVT ppx: Holding   - SCDs    Infectious Disease:  -Abx: Ancef complete  -Monitor WBC   -Monitor fever curve     Endocrine:  -Monitor blood glucose   -Dexamethasone 4 mg q 6 hours     Disposition: SICU

## 2023-12-28 NOTE — PROGRESS NOTE ADULT - ATTENDING COMMENTS
I agree with the detailed interval history, physical, and plan, which I have reviewed and edited where appropriate'; also agree with notes/assessment with my team on service.  I have personally examined the patient.  I was physically present for the key portions of the evaluation and management (E/M) service provided.  I reviewed all the pertinent data.  The patient is a critical care patient with life threatening hemodynamic and metabolic instability in SICU.  The SICU team has a constant risk benefit analyzes discussion and coordinating care with the primary team and all consultants.   The patient is in SICU with the chief complaint and diagnosis mentioned in the note.   The plan will be specified in the note.  33M s/p right craniotomy for fenestration of cyst into lateral ventricle; s/p R craniotomy with cyst fenestration c/b seizure intraoperatively in SICU for neuro checks.   PHYSICAL EXAM:  Gen: NAD  HEENT: SG drain-serosanginous  Resp: RA  Cardiac: RR  Abd: soft, nondistended    PLAN:   Neurologic:  -Keppra   -Tylenol   - Oxycodone   -Q4 neuro checks   Respiratory:  -RA  Cardiovascular:  -MAP goal >65  Gastrointestinal/Nutrition:  -Diet: Advance   -Protonix   Genitourinary/Renal:  -IVF: NS @ 75   Hematologic:  -DVT ppx: Hold   Infectious Disease:  -dc Ancef   -Monitor WBC   Endocrine:  -Monitor glucose     Disposition: SICU

## 2023-12-28 NOTE — CHART NOTE - NSCHARTNOTEFT_GEN_A_CORE
Patient s/p right craniotomy for fenestration of cyst into lateral ventricle today. Postoperatively, patient complaining of nausea and headaches. Patient has received Tylenol; however, he states that his headache has not improved. Additionally, patient noted to have 130 cc of sanguinous drain output.  Neurosurgery team made aware. At this time, they recommend continued monitoring. Patient can be given Oxycodone and Zofran as needed for pain and nausea.
SHERYL drain removed. 2 staples placed at drain site. Pt clear for the floor.
Patient transferred from SICU to Floor. Discussed with NSGY team. All questions and concerns answered.

## 2023-12-28 NOTE — PHYSICAL THERAPY INITIAL EVALUATION ADULT - ASSISTIVE DEVICE FOR TRANSFER: SIT/STAND, REHAB EVAL
Jacinda calling from [facility] assisted lining . Jacinda wants to update pcp that the patient is there now. Patient is also on warfin and has ptinr on Tuesday  and Friday. Jacinda  will fax ptinr results on tues. The caller stated it is okay for clinical staff to leave a detailed message on the patient's voice mail with their results or other medical information.     Phone number to leave message on [phone]    rolling walker

## 2023-12-28 NOTE — PHYSICAL THERAPY INITIAL EVALUATION ADULT - PERTINENT HX OF CURRENT PROBLEM, REHAB EVAL
33 year old male scheduled for Stereotactic Fenestration of Right Posterior Parietal Cyst Poss Insertion of Cysto peritoneal Shunt  with Dr Ornelas tentatively 12/27/23 - pt with prior hx fractured skull at age 3 weeks - now c/o of yrs of symptoms including HA , tingling of right cheek  and forehead, muscle tingling over body, loss of peripheral vision in both eyes that has worsened, leading patient to finally go to [facility] ER last week and evaluation noted cerebral cyst. 33 year old male scheduled for Stereotactic Fenestration of Right Posterior Parietal Cyst Poss Insertion of Cysto peritoneal Shunt  with Dr Ornelas tentatively 12/27/23 - pt with prior hx fractured skull at age 3 weeks - now c/o of yrs of symptoms including HA , tingling of right cheek  and forehead, muscle tingling over body, loss of peripheral vision in both eyes that has worsened, leading patient to finally go to [facility] ER last week and evaluation noted cerebral cyst.

## 2023-12-28 NOTE — PHYSICAL THERAPY INITIAL EVALUATION ADULT - GENERAL OBSERVATIONS, REHAB EVAL
Pt found in bed with head of bed elevated; HR 110bpm; +telemetry monitor; +SHERYL x 1 at incision site right cranium.

## 2023-12-29 ENCOUNTER — TRANSCRIPTION ENCOUNTER (OUTPATIENT)
Age: 33
End: 2023-12-29

## 2023-12-29 VITALS
RESPIRATION RATE: 20 BRPM | SYSTOLIC BLOOD PRESSURE: 120 MMHG | HEART RATE: 82 BPM | DIASTOLIC BLOOD PRESSURE: 82 MMHG | TEMPERATURE: 99 F | OXYGEN SATURATION: 98 %

## 2023-12-29 PROCEDURE — 70450 CT HEAD/BRAIN W/O DYE: CPT | Mod: 26,GC

## 2023-12-29 RX ORDER — OXYCODONE HYDROCHLORIDE 5 MG/1
1 TABLET ORAL
Qty: 18 | Refills: 0
Start: 2023-12-29 | End: 2023-12-31

## 2023-12-29 RX ORDER — ACETAMINOPHEN 500 MG
650 TABLET ORAL EVERY 6 HOURS
Refills: 0 | Status: DISCONTINUED | OUTPATIENT
Start: 2023-12-29 | End: 2023-12-29

## 2023-12-29 RX ORDER — ACETAMINOPHEN 500 MG
2 TABLET ORAL
Qty: 0 | Refills: 0 | DISCHARGE
Start: 2023-12-29

## 2023-12-29 RX ORDER — LEVETIRACETAM 250 MG/1
2 TABLET, FILM COATED ORAL
Qty: 120 | Refills: 2
Start: 2023-12-29 | End: 2024-03-27

## 2023-12-29 RX ORDER — ONDANSETRON 8 MG/1
1 TABLET, FILM COATED ORAL
Qty: 0 | Refills: 0 | DISCHARGE
Start: 2023-12-29

## 2023-12-29 RX ORDER — DEXAMETHASONE 0.5 MG/5ML
4 ELIXIR ORAL
Qty: 33 | Refills: 0
Start: 2023-12-29

## 2023-12-29 RX ORDER — POLYETHYLENE GLYCOL 3350 17 G/17G
17 POWDER, FOR SOLUTION ORAL
Qty: 0 | Refills: 0 | DISCHARGE
Start: 2023-12-29

## 2023-12-29 RX ORDER — LEVETIRACETAM 250 MG/1
1500 TABLET, FILM COATED ORAL
Refills: 0 | Status: DISCONTINUED | OUTPATIENT
Start: 2023-12-29 | End: 2023-12-29

## 2023-12-29 RX ADMIN — Medication 4 MILLIGRAM(S): at 05:16

## 2023-12-29 RX ADMIN — Medication 4 MILLIGRAM(S): at 00:15

## 2023-12-29 RX ADMIN — Medication 4 MILLIGRAM(S): at 11:29

## 2023-12-29 RX ADMIN — OXYCODONE HYDROCHLORIDE 10 MILLIGRAM(S): 5 TABLET ORAL at 00:55

## 2023-12-29 RX ADMIN — LEVETIRACETAM 1500 MILLIGRAM(S): 250 TABLET, FILM COATED ORAL at 05:17

## 2023-12-29 RX ADMIN — OXYCODONE HYDROCHLORIDE 10 MILLIGRAM(S): 5 TABLET ORAL at 01:49

## 2023-12-29 RX ADMIN — OXYCODONE HYDROCHLORIDE 10 MILLIGRAM(S): 5 TABLET ORAL at 05:10

## 2023-12-29 RX ADMIN — OXYCODONE HYDROCHLORIDE 10 MILLIGRAM(S): 5 TABLET ORAL at 06:10

## 2023-12-29 RX ADMIN — POLYETHYLENE GLYCOL 3350 17 GRAM(S): 17 POWDER, FOR SOLUTION ORAL at 11:30

## 2023-12-29 RX ADMIN — OXYCODONE HYDROCHLORIDE 5 MILLIGRAM(S): 5 TABLET ORAL at 09:50

## 2023-12-29 RX ADMIN — OXYCODONE HYDROCHLORIDE 5 MILLIGRAM(S): 5 TABLET ORAL at 08:52

## 2023-12-29 NOTE — DISCHARGE NOTE PROVIDER - NSDCMRMEDTOKEN_GEN_ALL_CORE_FT
acetaminophen 325 mg oral tablet: 2 tab(s) orally every 6 hours As needed Mild Pain (1 - 3)  dexAMETHasone 1 mg oral tablet: 4 tab(s) orally every 8 hours TAPER 4 TABS EVERY 8 HOURS x 1 day, 3 TABS EVERY 8 HOURS X 1 DAY, 2 TABS EVERY 8 HOURS X 1 DAY, 2 TAB EVERY 12 HOURS X 1 DAY, 2 TABS DAILY X 1 DAY THEN TOP  levETIRAcetam 750 mg oral tablet: 2 tab(s) orally 2 times a day  ondansetron 4 mg oral tablet, disintegratin tab(s) orally every 8 hours As needed Nausea and/or Vomiting  oxyCODONE 5 mg oral tablet: 1 tab(s) orally every 4 hours as needed for Moderate Pain (4 - 6) MDD: 6 tabs  polyethylene glycol 3350 oral powder for reconstitution: 17 gram(s) orally once a day

## 2023-12-29 NOTE — DISCHARGE NOTE PROVIDER - CARE PROVIDER_API CALL
Jose De Jesus Ornelas  Pediatric Neurosurgery  [address] [place] [address], [facility] [address]  [place] [address]  Phone: [phone]  Fax: [phone]  Follow Up Time: 1 week   Jose De Jesus Ornelas  Pediatric Neurosurgery  [address] [place] [address], [facility] [address]  [place] [address]  Phone: [phone]  Fax: [phone]  Follow Up Time: 1 week

## 2023-12-29 NOTE — DISCHARGE NOTE PROVIDER - HOSPITAL COURSE
33 yr old male scheduled for Stereotactic Fenestration of Right Posterior Parietal Cyst Poss Insertion of Cysto peritoneal Shunt  with Dr Ornelas tentatively 12/27/23 - pt with prior hx fractured skull at age 3 weeks - now c/o of yrs of symptoms including HA , tingling of right cheek  and forehead, muscle tingling over body, loss of peripheral vision in both eyes that has worsened -      12/27: 32 YO male stable POD # 1 S/P Right craniotomy for fenestration of cyst into lateral ventricle. complicated by intraop seizure, broke with propofol, SHERYL X 1, on keppra 1500BID and decadron  12/28 Post op CT decreased cyst, SHERYL drain with CSF output, removed PT- no needs

## 2023-12-29 NOTE — PROGRESS NOTE ADULT - ASSESSMENT
12/28: 34 YO male stable POD # 1 S/P Right craniotomy for fenestration of cyst into lateral ventricle. SHERYL X 1, on keppra and decadron  12/29: Stable exam POD # 2 S/P Right craniotomy for fenestration of cyst into lateral ventricle. SHERYL removed. Seen by PT, anticipate discharge home

## 2023-12-29 NOTE — DISCHARGE NOTE NURSING/CASE MANAGEMENT/SOCIAL WORK - PATIENT PORTAL LINK FT
You can access the FollowMyHealth Patient Portal offered by [facility] by registering at the following website: [email or web address]. By joining [facility]’s FollowMyHealth portal, you will also be able to view your health information using other applications (apps) compatible with our system. You can access the FollowMyHealth Patient Portal offered by [facility] by registering at the following website: [email or web address]. By joining [facility]’s FollowMyHealth portal, you will also be able to view your health information using other applications (apps) compatible with our system.

## 2023-12-29 NOTE — DISCHARGE NOTE PROVIDER - NSDCFUADDINST_GEN_ALL_CORE_FT
- You had surgery on 12/27 . The surgery you had was Right craniotomy for fenestration of cyst     - Remove bandage from incision site on post op day 3 if it was not removed by the surgical team prior to discharge. Once removed, incision site does not need a bandage or ointment on it. If you have steri strips (small, skinny beige strips), they will eventually fall off over time. Do not pull at steri strips. If steri strips are more than prison off, you may remove them. Do not touch or scratch incision to prevent infection.    - If your incision is closed with clear sutures, these are absorbable and will dissolve over time. If you see metallic staples or black stitches, these will need to be removed in the office 7-14 days after surgery. Your surgeon will tell you at your follow up appt when your sutures/staples will be removed, if applicable.  Do not pick or scratch at incision.     - Shower daily with shampoo/soap on post operative day 4 (DATE: 12/31) Avoid long soaks and do not submerge incision in water (no baths.) Allow soap and water to run over the incision. Pat incision area dry with clean towel- do not scrub. Please shower regularly to ensure incision stays clean to avoid post operative infections.  You may have a body shower daily, as long as your head incision is covered by a shower cap and does not get wet until post op day 4.     - Notify your surgeon if you notice increased redness, drainage or your incision area opening.     - Return to ER immediately for high fevers, severe headache, vomiting, lethargy or weakness    - Please call your neurosurgeon following discharge to make follow up appointment in 1 week after discharge unless otherwise specified. See contact information.    - Prescription post operative medication has been sent to [facility] in the hospital. All post operative prescriptions should be picked up before departing the hospital. You can also take over the counter tylenol for pain as needed.     - Ambulate as tolerate. Continue with all "activities of daily living." Avoid strenuous activity or heavy lifting until cleared for additional activity at your follow up appointment. You cannot drive while taking narcotics (oxycodone, valium, etc.)     - Do not return to work or school until cleared by your neurosurgeon at your follow up visit unless specified to you during your hospital stay    - Do not take any blood thinning medications such as aspirin, motrin, ibuprofen, warfarin, coumadin, plavix, heparin, lovenox, Xarelto, Eliquis etc. until cleared by your neurosurgeon    - Surgery, anesthesia, and pain medications can cause constipation. Please take over the counter stool softeners daily until regular bowel movements are achieved. Examples include Miralax, Senna, Colace, Milk of Magnesia, or Dulcolax suppositories. Please consult drug store pharmacist or pediatrician if there are question about dosing if the patient is pediatric.   - You had surgery on 12/27 . The surgery you had was Right craniotomy for fenestration of cyst     - Remove bandage from incision site on post op day 3 if it was not removed by the surgical team prior to discharge. Once removed, incision site does not need a bandage or ointment on it. If you have steri strips (small, skinny beige strips), they will eventually fall off over time. Do not pull at steri strips. If steri strips are more than intermediate off, you may remove them. Do not touch or scratch incision to prevent infection.    - If your incision is closed with clear sutures, these are absorbable and will dissolve over time. If you see metallic staples or black stitches, these will need to be removed in the office 7-14 days after surgery. Your surgeon will tell you at your follow up appt when your sutures/staples will be removed, if applicable.  Do not pick or scratch at incision.     - Shower daily with shampoo/soap on post operative day 4 (DATE: 12/31) Avoid long soaks and do not submerge incision in water (no baths.) Allow soap and water to run over the incision. Pat incision area dry with clean towel- do not scrub. Please shower regularly to ensure incision stays clean to avoid post operative infections.  You may have a body shower daily, as long as your head incision is covered by a shower cap and does not get wet until post op day 4.     - Notify your surgeon if you notice increased redness, drainage or your incision area opening.     - Return to ER immediately for high fevers, severe headache, vomiting, lethargy or weakness    - Please call your neurosurgeon following discharge to make follow up appointment in 1 week after discharge unless otherwise specified. See contact information.    - Prescription post operative medication has been sent to [facility] in the hospital. All post operative prescriptions should be picked up before departing the hospital. You can also take over the counter tylenol for pain as needed.     - Ambulate as tolerate. Continue with all "activities of daily living." Avoid strenuous activity or heavy lifting until cleared for additional activity at your follow up appointment. You cannot drive while taking narcotics (oxycodone, valium, etc.)     - Do not return to work or school until cleared by your neurosurgeon at your follow up visit unless specified to you during your hospital stay    - Do not take any blood thinning medications such as aspirin, motrin, ibuprofen, warfarin, coumadin, plavix, heparin, lovenox, Xarelto, Eliquis etc. until cleared by your neurosurgeon    - Surgery, anesthesia, and pain medications can cause constipation. Please take over the counter stool softeners daily until regular bowel movements are achieved. Examples include Miralax, Senna, Colace, Milk of Magnesia, or Dulcolax suppositories. Please consult drug store pharmacist or pediatrician if there are question about dosing if the patient is pediatric.

## 2023-12-29 NOTE — DISCHARGE NOTE PROVIDER - PROVIDER TOKENS
PROVIDER:[TOKEN:[[id number]],FOLLOWUP:[1 week]] PROVIDER:[TOKEN:[[id number]],FOLLOWUP:[1 week]]

## 2023-12-29 NOTE — PROGRESS NOTE ADULT - PROBLEM SELECTOR PLAN 1
Neurologic checks Q4H  Continue PT  Discharge planning
Neurologic checks Q1H  Postop head CT this AM  Monitor drain output  Continue keppra and decadron  Advance diet  PT consult

## 2023-12-29 NOTE — PROGRESS NOTE ADULT - SUBJECTIVE AND OBJECTIVE BOX
Called by RN that patient had 2 episodes of emesis and yawning. Patient neurologically intact otherwise.  Called Neurosurgery and will continue to monitor - will get a head CT if patient becomes lethargic/sleepy  Decadron and Keppra switched from PO to IV
SHERYL drain removed  Initial PT evaluation done  No issues overnight  Vital Signs Last 24 Hrs  T(C): 36.3 (29 Dec 2023 01:25), Max: 37 (28 Dec 2023 17:58)  T(F): 97.3 (29 Dec 2023 01:25), Max: 98.6 (28 Dec 2023 17:58)  HR: 80 (29 Dec 2023 01:25) (78 - 111)  BP: 115/73 (29 Dec 2023 01:25) (95/54 - 137/74)  BP(mean): 97 (28 Dec 2023 14:00) (64 - 97)  RR: 20 (29 Dec 2023 01:25) (13 - 24)  SpO2: 96% (29 Dec 2023 01:25) (94% - 100%)    Parameters below as of 29 Dec 2023 01:25  Patient On (Oxygen Delivery Method): room air    AAO X 3  PERRLA, EOMI  CN 2-12 grossly intact  GARCIA strength 5/5  No dysmetria or drift  SILT    MEDICATIONS  (STANDING):  dexAMETHasone  Injectable 4 milliGRAM(s) IV Push every 6 hours  influenza   Vaccine 0.5 milliLiter(s) IntraMuscular once  levETIRAcetam   Injectable 1500 milliGRAM(s) IV Push every 12 hours  polyethylene glycol 3350 17 Gram(s) Oral daily  senna 2 Tablet(s) Oral at bedtime    MEDICATIONS  (PRN):  bisacodyl 5 milliGRAM(s) Oral daily PRN Constipation  ondansetron   Disintegrating Tablet 4 milliGRAM(s) Oral every 8 hours PRN Nausea and/or Vomiting  oxyCODONE    IR 10 milliGRAM(s) Oral every 4 hours PRN Severe Pain (7 - 10)  oxyCODONE    IR 5 milliGRAM(s) Oral every 4 hours PRN Moderate Pain (4 - 6)                          14.5   8.00  )-----------( 165      ( 28 Dec 2023 02:19 )             42.2     12-28    140  |  103  |  20  ----------------------------<  151<H>  3.9   |  24  |  0.77    Ca    9.5      28 Dec 2023 02:19  Phos  3.1     12-28  Mg     1.80     12-28    TPro  6.9  /  Alb  4.1  /  TBili  0.3  /  DBili  x   /  AST  19  /  ALT  13  /  AlkPhos  76  12-27    < from: CT Head No Cont (12.28.23 @ 05:44) >  Comparison is made with prior CT of 12/27/2023.    There has been a right parietal craniotomy. There is air in the small   residual low density cystic focus in the right parietal region and in the   subdural space. There is no hemorrhage. There is residual mass effect on   the atrium of the right lateral ventricle. There is no midline shift or   hydrocephalus.    IMPRESSION: No significant change since 12/27/2023. Small amount of   postoperative air in the small residual right parietal cyst.    < end of copied text >  
24 HOUR EVENTS:   - episode of emesis overnight- neurosx aware  - pending CTH in AM      SUBJECTIVE/ROS:  [ ] A ten-point review of systems was otherwise negative except as noted.  [ ] Due to altered mental status/intubation, subjective information were not able to be obtained from the patient. History was obtained, to the extent possible, from review of the chart and collateral sources of information.      NEURO  Exam: awake, alert, oriented. Drain with bloody output.   Meds: acetaminophen   IVPB .. 1000 milliGRAM(s) IV Intermittent every 6 hours  HYDROmorphone  Injectable 0.5 milliGRAM(s) IV Push every 6 hours PRN Severe Pain (7 - 10)  levETIRAcetam   Injectable 1500 milliGRAM(s) IV Push every 12 hours  oxyCODONE    IR 10 milliGRAM(s) Oral every 4 hours PRN Severe Pain (7 - 10)  oxyCODONE    IR 5 milliGRAM(s) Oral every 4 hours PRN Moderate Pain (4 - 6)    [x] Adequacy of sedation and pain control has been assessed and adjusted      RESPIRATORY  RR: 17 (12-27-23 @ 23:00) (14 - 20)  SpO2: 100% (12-27-23 @ 23:00) (96% - 100%)  Wt(kg): --  Exam: unlabored, clear to auscultation bilaterally  Mechanical Ventilation:     [N/A] Extubation Readiness Assessed  Meds:       CARDIOVASCULAR  HR: 69 (12-27-23 @ 23:00) (59 - 97)  BP: 119/79 (12-27-23 @ 23:00) (95/62 - 119/79)  BP(mean): 49 (12-27-23 @ 23:00) (49 - 85)  ABP: --  ABP(mean): --  Wt(kg): --  CVP(cm H2O): --      Exam: regular rate and rhythm  Cardiac Rhythm: sinus  Perfusion     [x]Adequate   [ ]Inadequate  Mentation   [x]Normal       [ ]Reduced  Extremities  [x]Warm         [ ]Cool  Volume Status [ ]Hypervolemic [x]Euvolemic [ ]Hypovolemic  Meds:       GI/NUTRITION  Exam: soft, nontender, nondistended  Diet:  Meds: bisacodyl 5 milliGRAM(s) Oral daily PRN Constipation  pantoprazole    Tablet 40 milliGRAM(s) Oral before breakfast  polyethylene glycol 3350 17 Gram(s) Oral daily  senna 2 Tablet(s) Oral at bedtime      GENITOURINARY  I&O's Detail    12-27 @ 07:01  -  12-28 @ 00:12  --------------------------------------------------------  IN:    IV PiggyBack: 150 mL  Total IN: 150 mL    OUT:    Bulb (mL): 270 mL    Voided (mL): 1000 mL  Total OUT: 1270 mL    Total NET: -1120 mL        Weight (kg): 79.4 (12-27 @ 05:31)  12-27    139  |  103  |  17  ----------------------------<  118<H>  4.3   |  24  |  0.92    Ca    9.4      27 Dec 2023 13:15  Phos  2.6     12-27  Mg     1.90     12-27    TPro  6.9  /  Alb  4.1  /  TBili  0.3  /  DBili  x   /  AST  19  /  ALT  13  /  AlkPhos  76  12-27    [ ] Ochoa catheter, indication: N/A  Meds:       HEMATOLOGIC  Meds:   [x] VTE Prophylaxis                        15.5   4.59  )-----------( 167      ( 27 Dec 2023 13:15 )             44.9       Transfusion     [ ] PRBC   [ ] Platelets   [ ] FFP   [ ] Cryoprecipitate      INFECTIOUS DISEASES  WBC Count: 4.59 K/uL (12-27 @ 13:15)    RECENT CULTURES:    Meds: influenza   Vaccine 0.5 milliLiter(s) IntraMuscular once        ENDOCRINE  CAPILLARY BLOOD GLUCOSE        Meds: dexAMETHasone  Injectable 4 milliGRAM(s) IV Push every 6 hours        ACCESS DEVICES:  [ ] Peripheral IV  [ ] Central Venous Line	[ ] R	[ ] L	[ ] IJ	[ ] Fem	[ ] SC	Placed:   [ ] Arterial Line		[ ] R	[ ] L	[ ] Fem	[ ] Rad	[ ] Ax	Placed:   [ ] PICC:					[ ] Mediport  [ ] Urinary Catheter, Date Placed:   [x] Necessity of urinary, arterial, and venous catheters discussed    OTHER MEDICATIONS:  chlorhexidine 2% Cloths 1 Application(s) Topical daily      CODE STATUS:      IMAGING:
No further seizure activity noted  C/O mild headache, vomited X 2  POD # 1 S/P fenestration of cerebral cyst  ICU Vital Signs Last 24 Hrs  T(C): 36.3 (27 Dec 2023 20:00), Max: 36.8 (27 Dec 2023 05:31)  T(F): 97.4 (27 Dec 2023 20:00), Max: 98.2 (27 Dec 2023 05:31)  HR: 69 (27 Dec 2023 23:00) (59 - 97)  BP: 119/79 (27 Dec 2023 23:00) (95/62 - 119/79)  BP(mean): 49 (27 Dec 2023 23:00) (49 - 85)  ABP: --  ABP(mean): --  RR: 17 (27 Dec 2023 23:00) (14 - 20)  SpO2: 100% (27 Dec 2023 23:00) (96% - 100%)    O2 Parameters below as of 27 Dec 2023 18:00  Patient On (Oxygen Delivery Method): room air    AAO X 3  PERRLA, EOMI  CN 2-12 grossly intact  GARCIA strength 5/5  No dysmetria or drift  SILT    SHERYL: 270cc    MEDICATIONS  (STANDING):  acetaminophen   IVPB .. 1000 milliGRAM(s) IV Intermittent every 6 hours  chlorhexidine 2% Cloths 1 Application(s) Topical daily  dexAMETHasone  Injectable 4 milliGRAM(s) IV Push every 6 hours  influenza   Vaccine 0.5 milliLiter(s) IntraMuscular once  levETIRAcetam   Injectable 1500 milliGRAM(s) IV Push every 12 hours  pantoprazole    Tablet 40 milliGRAM(s) Oral before breakfast  polyethylene glycol 3350 17 Gram(s) Oral daily  senna 2 Tablet(s) Oral at bedtime    MEDICATIONS  (PRN):  bisacodyl 5 milliGRAM(s) Oral daily PRN Constipation  HYDROmorphone  Injectable 0.5 milliGRAM(s) IV Push every 6 hours PRN Severe Pain (7 - 10)  oxyCODONE    IR 10 milliGRAM(s) Oral every 4 hours PRN Severe Pain (7 - 10)  oxyCODONE    IR 5 milliGRAM(s) Oral every 4 hours PRN Moderate Pain (4 - 6)                          15.5   4.59  )-----------( 167      ( 27 Dec 2023 13:15 )             44.9     12-27    139  |  103  |  17  ----------------------------<  118<H>  4.3   |  24  |  0.92    Ca    9.4      27 Dec 2023 13:15  Phos  2.6     12-27  Mg     1.90     12-27    TPro  6.9  /  Alb  4.1  /  TBili  0.3  /  DBili  x   /  AST  19  /  ALT  13  /  AlkPhos  76  12-27

## 2023-12-29 NOTE — DISCHARGE NOTE NURSING/CASE MANAGEMENT/SOCIAL WORK - NSDPACMPNY_GEN_ALL_CORE
Not appropriate E-visit.  Her PCP may be willing to address concerns, however I recommend scheduling an in person visit for medication adjustment and discussion of service animal.  Provider E-Visit time total (minutes): 0   Family

## 2023-12-29 NOTE — DISCHARGE NOTE PROVIDER - CARE PROVIDERS DIRECT ADDRESSES
[email or web address]

## 2023-12-29 NOTE — DISCHARGE NOTE NURSING/CASE MANAGEMENT/SOCIAL WORK - NSDCPEFALRISK_GEN_ALL_CORE
For information on Fall & Injury Prevention, visit: [email or web address] OR  [email or web address] OR  https://www.cdc.gov/steadi/patient.html For information on Fall & Injury Prevention, visit: [email or web address] OR  [email or web address] OR  https://www.cdc.gov/steadi/patient.html

## 2024-01-03 ENCOUNTER — APPOINTMENT (OUTPATIENT)
Dept: MRI IMAGING | Facility: CLINIC | Age: 34
End: 2024-01-03
Payer: COMMERCIAL

## 2024-01-03 PROBLEM — S02.91XA UNSPECIFIED FRACTURE OF SKULL, INITIAL ENCOUNTER FOR CLOSED FRACTURE: Chronic | Status: ACTIVE | Noted: 2023-12-19

## 2024-01-03 PROCEDURE — 70551 MRI BRAIN STEM W/O DYE: CPT

## 2024-01-05 ENCOUNTER — EMERGENCY (EMERGENCY)
Facility: HOSPITAL | Age: 34
LOS: 1 days | Discharge: HOME | End: 2024-01-05
Attending: EMERGENCY MEDICINE | Admitting: EMERGENCY MEDICINE
Payer: COMMERCIAL

## 2024-01-05 VITALS
SYSTOLIC BLOOD PRESSURE: 107 MMHG | RESPIRATION RATE: 18 BRPM | OXYGEN SATURATION: 97 % | HEIGHT: 68 IN | DIASTOLIC BLOOD PRESSURE: 75 MMHG | HEART RATE: 81 BPM

## 2024-01-05 DIAGNOSIS — Z87.81 PERSONAL HISTORY OF (HEALED) TRAUMATIC FRACTURE: Chronic | ICD-10-CM

## 2024-01-05 DIAGNOSIS — R51.9 HEADACHE, UNSPECIFIED: ICD-10-CM

## 2024-01-05 DIAGNOSIS — Z98.890 OTHER SPECIFIED POSTPROCEDURAL STATES: Chronic | ICD-10-CM

## 2024-01-05 LAB
ALBUMIN SERPL ELPH-MCNC: 3.6 G/DL — SIGNIFICANT CHANGE UP (ref 3.3–5)
ALBUMIN SERPL ELPH-MCNC: 3.6 G/DL — SIGNIFICANT CHANGE UP (ref 3.3–5)
ALP SERPL-CCNC: 66 U/L — SIGNIFICANT CHANGE UP (ref 40–120)
ALP SERPL-CCNC: 66 U/L — SIGNIFICANT CHANGE UP (ref 40–120)
ALT FLD-CCNC: 23 U/L — SIGNIFICANT CHANGE UP (ref 4–41)
ALT FLD-CCNC: 23 U/L — SIGNIFICANT CHANGE UP (ref 4–41)
ANION GAP SERPL CALC-SCNC: 9 MMOL/L — SIGNIFICANT CHANGE UP (ref 7–14)
ANION GAP SERPL CALC-SCNC: 9 MMOL/L — SIGNIFICANT CHANGE UP (ref 7–14)
APTT BLD: 24.7 SEC — SIGNIFICANT CHANGE UP (ref 24.5–35.6)
APTT BLD: 24.7 SEC — SIGNIFICANT CHANGE UP (ref 24.5–35.6)
AST SERPL-CCNC: 15 U/L — SIGNIFICANT CHANGE UP (ref 4–40)
AST SERPL-CCNC: 15 U/L — SIGNIFICANT CHANGE UP (ref 4–40)
BASOPHILS # BLD AUTO: 0.04 K/UL — SIGNIFICANT CHANGE UP (ref 0–0.2)
BASOPHILS # BLD AUTO: 0.04 K/UL — SIGNIFICANT CHANGE UP (ref 0–0.2)
BASOPHILS NFR BLD AUTO: 0.6 % — SIGNIFICANT CHANGE UP (ref 0–2)
BASOPHILS NFR BLD AUTO: 0.6 % — SIGNIFICANT CHANGE UP (ref 0–2)
BILIRUB SERPL-MCNC: 0.4 MG/DL — SIGNIFICANT CHANGE UP (ref 0.2–1.2)
BILIRUB SERPL-MCNC: 0.4 MG/DL — SIGNIFICANT CHANGE UP (ref 0.2–1.2)
BLD GP AB SCN SERPL QL: NEGATIVE — SIGNIFICANT CHANGE UP
BLD GP AB SCN SERPL QL: NEGATIVE — SIGNIFICANT CHANGE UP
BUN SERPL-MCNC: 16 MG/DL — SIGNIFICANT CHANGE UP (ref 7–23)
BUN SERPL-MCNC: 16 MG/DL — SIGNIFICANT CHANGE UP (ref 7–23)
CALCIUM SERPL-MCNC: 8.7 MG/DL — SIGNIFICANT CHANGE UP (ref 8.4–10.5)
CALCIUM SERPL-MCNC: 8.7 MG/DL — SIGNIFICANT CHANGE UP (ref 8.4–10.5)
CHLORIDE SERPL-SCNC: 100 MMOL/L — SIGNIFICANT CHANGE UP (ref 98–107)
CHLORIDE SERPL-SCNC: 100 MMOL/L — SIGNIFICANT CHANGE UP (ref 98–107)
CO2 SERPL-SCNC: 28 MMOL/L — SIGNIFICANT CHANGE UP (ref 22–31)
CO2 SERPL-SCNC: 28 MMOL/L — SIGNIFICANT CHANGE UP (ref 22–31)
CREAT SERPL-MCNC: 0.98 MG/DL — SIGNIFICANT CHANGE UP (ref 0.5–1.3)
CREAT SERPL-MCNC: 0.98 MG/DL — SIGNIFICANT CHANGE UP (ref 0.5–1.3)
EGFR: 106 ML/MIN/1.73M2 — SIGNIFICANT CHANGE UP
EGFR: 106 ML/MIN/1.73M2 — SIGNIFICANT CHANGE UP
EOSINOPHIL # BLD AUTO: 0.09 K/UL — SIGNIFICANT CHANGE UP (ref 0–0.5)
EOSINOPHIL # BLD AUTO: 0.09 K/UL — SIGNIFICANT CHANGE UP (ref 0–0.5)
EOSINOPHIL NFR BLD AUTO: 1.4 % — SIGNIFICANT CHANGE UP (ref 0–6)
EOSINOPHIL NFR BLD AUTO: 1.4 % — SIGNIFICANT CHANGE UP (ref 0–6)
GLUCOSE SERPL-MCNC: 85 MG/DL — SIGNIFICANT CHANGE UP (ref 70–99)
GLUCOSE SERPL-MCNC: 85 MG/DL — SIGNIFICANT CHANGE UP (ref 70–99)
HCT VFR BLD CALC: 42.5 % — SIGNIFICANT CHANGE UP (ref 39–50)
HCT VFR BLD CALC: 42.5 % — SIGNIFICANT CHANGE UP (ref 39–50)
HGB BLD-MCNC: 14.9 G/DL — SIGNIFICANT CHANGE UP (ref 13–17)
HGB BLD-MCNC: 14.9 G/DL — SIGNIFICANT CHANGE UP (ref 13–17)
IANC: 3.18 K/UL — SIGNIFICANT CHANGE UP (ref 1.8–7.4)
IANC: 3.18 K/UL — SIGNIFICANT CHANGE UP (ref 1.8–7.4)
IMM GRANULOCYTES NFR BLD AUTO: 2.1 % — HIGH (ref 0–0.9)
IMM GRANULOCYTES NFR BLD AUTO: 2.1 % — HIGH (ref 0–0.9)
INR BLD: 0.98 RATIO — SIGNIFICANT CHANGE UP (ref 0.85–1.18)
INR BLD: 0.98 RATIO — SIGNIFICANT CHANGE UP (ref 0.85–1.18)
LYMPHOCYTES # BLD AUTO: 2.55 K/UL — SIGNIFICANT CHANGE UP (ref 1–3.3)
LYMPHOCYTES # BLD AUTO: 2.55 K/UL — SIGNIFICANT CHANGE UP (ref 1–3.3)
LYMPHOCYTES # BLD AUTO: 38.4 % — SIGNIFICANT CHANGE UP (ref 13–44)
LYMPHOCYTES # BLD AUTO: 38.4 % — SIGNIFICANT CHANGE UP (ref 13–44)
MCHC RBC-ENTMCNC: 30.5 PG — SIGNIFICANT CHANGE UP (ref 27–34)
MCHC RBC-ENTMCNC: 30.5 PG — SIGNIFICANT CHANGE UP (ref 27–34)
MCHC RBC-ENTMCNC: 35.1 GM/DL — SIGNIFICANT CHANGE UP (ref 32–36)
MCHC RBC-ENTMCNC: 35.1 GM/DL — SIGNIFICANT CHANGE UP (ref 32–36)
MCV RBC AUTO: 86.9 FL — SIGNIFICANT CHANGE UP (ref 80–100)
MCV RBC AUTO: 86.9 FL — SIGNIFICANT CHANGE UP (ref 80–100)
MONOCYTES # BLD AUTO: 0.64 K/UL — SIGNIFICANT CHANGE UP (ref 0–0.9)
MONOCYTES # BLD AUTO: 0.64 K/UL — SIGNIFICANT CHANGE UP (ref 0–0.9)
MONOCYTES NFR BLD AUTO: 9.6 % — SIGNIFICANT CHANGE UP (ref 2–14)
MONOCYTES NFR BLD AUTO: 9.6 % — SIGNIFICANT CHANGE UP (ref 2–14)
NEUTROPHILS # BLD AUTO: 3.18 K/UL — SIGNIFICANT CHANGE UP (ref 1.8–7.4)
NEUTROPHILS # BLD AUTO: 3.18 K/UL — SIGNIFICANT CHANGE UP (ref 1.8–7.4)
NEUTROPHILS NFR BLD AUTO: 47.9 % — SIGNIFICANT CHANGE UP (ref 43–77)
NEUTROPHILS NFR BLD AUTO: 47.9 % — SIGNIFICANT CHANGE UP (ref 43–77)
NRBC # BLD: 0 /100 WBCS — SIGNIFICANT CHANGE UP (ref 0–0)
NRBC # BLD: 0 /100 WBCS — SIGNIFICANT CHANGE UP (ref 0–0)
NRBC # FLD: 0 K/UL — SIGNIFICANT CHANGE UP (ref 0–0)
NRBC # FLD: 0 K/UL — SIGNIFICANT CHANGE UP (ref 0–0)
PLATELET # BLD AUTO: 208 K/UL — SIGNIFICANT CHANGE UP (ref 150–400)
PLATELET # BLD AUTO: 208 K/UL — SIGNIFICANT CHANGE UP (ref 150–400)
POTASSIUM SERPL-MCNC: 4 MMOL/L — SIGNIFICANT CHANGE UP (ref 3.5–5.3)
POTASSIUM SERPL-MCNC: 4 MMOL/L — SIGNIFICANT CHANGE UP (ref 3.5–5.3)
POTASSIUM SERPL-SCNC: 4 MMOL/L — SIGNIFICANT CHANGE UP (ref 3.5–5.3)
POTASSIUM SERPL-SCNC: 4 MMOL/L — SIGNIFICANT CHANGE UP (ref 3.5–5.3)
PROT SERPL-MCNC: 6.1 G/DL — SIGNIFICANT CHANGE UP (ref 6–8.3)
PROT SERPL-MCNC: 6.1 G/DL — SIGNIFICANT CHANGE UP (ref 6–8.3)
PROTHROM AB SERPL-ACNC: 11.1 SEC — SIGNIFICANT CHANGE UP (ref 9.5–13)
PROTHROM AB SERPL-ACNC: 11.1 SEC — SIGNIFICANT CHANGE UP (ref 9.5–13)
RBC # BLD: 4.89 M/UL — SIGNIFICANT CHANGE UP (ref 4.2–5.8)
RBC # BLD: 4.89 M/UL — SIGNIFICANT CHANGE UP (ref 4.2–5.8)
RBC # FLD: 12.5 % — SIGNIFICANT CHANGE UP (ref 10.3–14.5)
RBC # FLD: 12.5 % — SIGNIFICANT CHANGE UP (ref 10.3–14.5)
RH IG SCN BLD-IMP: POSITIVE — SIGNIFICANT CHANGE UP
RH IG SCN BLD-IMP: POSITIVE — SIGNIFICANT CHANGE UP
SODIUM SERPL-SCNC: 137 MMOL/L — SIGNIFICANT CHANGE UP (ref 135–145)
SODIUM SERPL-SCNC: 137 MMOL/L — SIGNIFICANT CHANGE UP (ref 135–145)
WBC # BLD: 6.64 K/UL — SIGNIFICANT CHANGE UP (ref 3.8–10.5)
WBC # BLD: 6.64 K/UL — SIGNIFICANT CHANGE UP (ref 3.8–10.5)
WBC # FLD AUTO: 6.64 K/UL — SIGNIFICANT CHANGE UP (ref 3.8–10.5)
WBC # FLD AUTO: 6.64 K/UL — SIGNIFICANT CHANGE UP (ref 3.8–10.5)

## 2024-01-05 PROCEDURE — 93010 ELECTROCARDIOGRAM REPORT: CPT

## 2024-01-05 PROCEDURE — 99285 EMERGENCY DEPT VISIT HI MDM: CPT

## 2024-01-05 PROCEDURE — 77011 CT SCAN FOR LOCALIZATION: CPT | Mod: 26

## 2024-01-05 RX ORDER — ACETAMINOPHEN 500 MG
1000 TABLET ORAL ONCE
Refills: 0 | Status: COMPLETED | OUTPATIENT
Start: 2024-01-05 | End: 2024-01-05

## 2024-01-05 RX ORDER — SODIUM CHLORIDE 9 MG/ML
1000 INJECTION INTRAMUSCULAR; INTRAVENOUS; SUBCUTANEOUS ONCE
Refills: 0 | Status: COMPLETED | OUTPATIENT
Start: 2024-01-05 | End: 2024-01-05

## 2024-01-05 RX ORDER — DEXAMETHASONE 0.5 MG/5ML
3 ELIXIR ORAL EVERY 8 HOURS
Refills: 0 | Status: DISCONTINUED | OUTPATIENT
Start: 2024-01-05 | End: 2024-01-08

## 2024-01-05 RX ORDER — DEXAMETHASONE 0.5 MG/5ML
6 ELIXIR ORAL ONCE
Refills: 0 | Status: COMPLETED | OUTPATIENT
Start: 2024-01-05 | End: 2024-01-05

## 2024-01-05 RX ORDER — MAGNESIUM SULFATE 500 MG/ML
2 VIAL (ML) INJECTION ONCE
Refills: 0 | Status: COMPLETED | OUTPATIENT
Start: 2024-01-05 | End: 2024-01-05

## 2024-01-05 RX ORDER — MORPHINE SULFATE 50 MG/1
4 CAPSULE, EXTENDED RELEASE ORAL ONCE
Refills: 0 | Status: DISCONTINUED | OUTPATIENT
Start: 2024-01-05 | End: 2024-01-05

## 2024-01-05 RX ORDER — DEXAMETHASONE 0.5 MG/5ML
2 ELIXIR ORAL
Qty: 84 | Refills: 0
Start: 2024-01-05 | End: 2024-01-18

## 2024-01-05 RX ORDER — METOCLOPRAMIDE HCL 10 MG
10 TABLET ORAL ONCE
Refills: 0 | Status: COMPLETED | OUTPATIENT
Start: 2024-01-05 | End: 2024-01-05

## 2024-01-05 RX ADMIN — SODIUM CHLORIDE 1000 MILLILITER(S): 9 INJECTION INTRAMUSCULAR; INTRAVENOUS; SUBCUTANEOUS at 16:28

## 2024-01-05 RX ADMIN — MORPHINE SULFATE 4 MILLIGRAM(S): 50 CAPSULE, EXTENDED RELEASE ORAL at 14:00

## 2024-01-05 RX ADMIN — Medication 2 GRAM(S): at 15:28

## 2024-01-05 RX ADMIN — Medication 6 MILLIGRAM(S): at 13:52

## 2024-01-05 RX ADMIN — MORPHINE SULFATE 4 MILLIGRAM(S): 50 CAPSULE, EXTENDED RELEASE ORAL at 13:52

## 2024-01-05 RX ADMIN — Medication 400 MILLIGRAM(S): at 13:52

## 2024-01-05 RX ADMIN — Medication 1000 MILLIGRAM(S): at 14:00

## 2024-01-05 RX ADMIN — Medication 1000 MILLIGRAM(S): at 16:29

## 2024-01-05 RX ADMIN — Medication 10 MILLIGRAM(S): at 13:53

## 2024-01-05 NOTE — ED PROVIDER NOTE - PATIENT PORTAL LINK FT
You can access the FollowMyHealth Patient Portal offered by [facility] by registering at the following website: [email or web address]. By joining [employer]’s FollowMyHealth portal, you will also be able to view your health information using other applications (apps) compatible with our system. You can access the FollowMyHealth Patient Portal offered by [facility] by registering at the following website: [email or web address]. By joining [employer]’s FollowMyHealth portal, you will also be able to view your health information using other applications (apps) compatible with our system.

## 2024-01-05 NOTE — ED PROVIDER NOTE - PROGRESS NOTE DETAILS
Kathleen Pitts, PGY-2 DO:  Patient re-evaluated at bedside and is now currently feeling better after treatment. Patient cleared by neurosurgery to be d/c'd.

## 2024-01-05 NOTE — ED PROVIDER NOTE - OBJECTIVE STATEMENT
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  The patient reports that he has been progressing well since his surgery in December.  The patient reports that last night he began to have severe headache with nausea.  The patient is also endorsing sensitivity to light and sounds.  The patient has been taking Tylenol at home with minimal symptom relief.  The patient reports that he has had no fevers.  The patient reports that his surgeon Dr. Ornelas told him to present to the ED for further evaluation.  The patient denies chest pain, shortness of breath, urinary symptoms, diarrhea, fevers or chills.

## 2024-01-05 NOTE — ED ADULT NURSE NOTE - NSFALLUNIVINTERV_ED_ALL_ED
Bed/Stretcher in lowest position, wheels locked, appropriate side rails in place/Call bell, personal items and telephone in reach/Instruct patient to call for assistance before getting out of bed/chair/stretcher/Non-slip footwear applied when patient is off stretcher/[place] to call system/Physically safe environment - no spills, clutter or unnecessary equipment/Purposeful proactive rounding/Room/bathroom lighting operational, light cord in reach Bed/Stretcher in lowest position, wheels locked, appropriate side rails in place/Call bell, personal items and telephone in reach/Instruct patient to call for assistance before getting out of bed/chair/stretcher/Non-slip footwear applied when patient is off stretcher/[place] to call system/Physically safe environment - no spills, clutter or unnecessary equipment/Purposeful proactive rounding/Room/bathroom lighting operational, light cord in reach

## 2024-01-05 NOTE — ED ADULT NURSE NOTE - OBJECTIVE STATEMENT
Pt AAOx4, breathing room air s/p cyst removal brain surgery, pt on fall precautions HOB elevated seizure precautions nausea no vomiting c/o HA x yesterday Pt AAOx4, breathing room air s/p cyst removal brain surgery, pt on fall precautions HOB elevated seizure precautions nausea no vomiting c/o HA x yesterday, motor sensory intact, pt reports light bothers him.

## 2024-01-05 NOTE — ED ADULT NURSE REASSESSMENT NOTE - NS ED NURSE REASSESS COMMENT FT1
pt c/o abd pain MD notified no active nausea vomiting diarrhea pt denies ha at this time pt received medications as prescribed

## 2024-01-05 NOTE — ED ADULT TRIAGE NOTE - CHIEF COMPLAINT QUOTE
had brain surgery 8 days ago had a fenestration for cyst in brain Right Side  Dr Ornelas  NoT A CoDe StroKe as per Dr Ramos had brain surgery 8 days ago had a fenestration for cyst in brain Right Side  Dr Ornelas  NoT A CoDe StroKe as per Dr Ramos   large incision noted right side of skull with staples area looks approximated well and dry c/o some incision pain

## 2024-01-05 NOTE — CONSULT NOTE ADULT - ASSESSMENT
32yo M s/p right craniotomy for fenestration of cyst into lateral ventricle now presenting with headaches.  34yo M s/p right craniotomy for fenestration of cyst into lateral ventricle now presenting with headaches.

## 2024-01-05 NOTE — ED PROVIDER NOTE - NSFOLLOWUPINSTRUCTIONS_ED_ALL_ED_FT
Migraine Headache  WHAT YOU NEED TO KNOW:  A migraine is a severe headache. The pain can be so severe that it interferes with your daily activities. A migraine can last a few hours up to several days. The exact cause of migraines is not known.    DISCHARGE INSTRUCTIONS:  Return to the emergency department if:  You have a headache that seems different or much worse than your usual migraine headache.  You have a severe headache with a fever or a stiff neck.  You have new problems with speech, vision, balance, or movement.  You feel like you are going to faint, you become confused, or you have a seizure.  Contact your healthcare provider or neurologist if:  Your migraines interfere with your daily activities.  Your medicines or treatments stop working.  You have questions or concerns about your condition or care.  Medicines: You may need any of the following. Take medicine as soon as you feel a migraine begin.  Prescription pain medicine may be given. Do not wait until the pain is severe before you take your medicine.  Migraine medicines are used to help prevent a migraine or stop it once it starts.  Antinausea medicine may be given to calm your stomach and to help prevent vomiting. This medicine can also help relieve pain.  Take your medicine as directed. Contact your healthcare provider if you think your medicine is not helping or if you have side effects. Tell him or her if you are allergic to any medicine. Keep a list of the medicines, vitamins, and herbs you take. Include the amounts, and when and why you take them. Bring the list or the pill bottles to follow-up visits. Carry your medicine list with you in case of an emergency.  Manage your symptoms:  Rest in a dark, quiet room. This will help decrease your pain. Sleep may also help relieve the pain.  Apply ice to decrease pain. Use an ice pack, or put crushed ice in a plastic bag. Cover the ice pack with a towel and place it on your head. Apply ice for 15 to 20 minutes every  hour.  Apply heat to decrease pain and muscle spasms. Use a small towel dampened with warm water or a heating pad, or sit in a warm bath. Apply heat on the area for 20 to 30 minutes every 2 hours. You may alternate heat and ice.  Keep a migraine record. Write down when your migraines start and stop. Include your symptoms and what you were doing when a migraine began. Record what you ate or drank for 24 hours before the migraine started. Keep track of what you did to treat your migraine and if it worked. Bring the migraine record with you to visits with your healthcare provider.  Follow up with your healthcare provider or neurologist as directed: Bring your migraine record with you. Write down your questions so you remember to ask them during your visits.  Prevent another migraine:  Do not smoke. Nicotine and other chemicals in cigarettes and cigars can trigger a migraine or make it worse. Ask your healthcare provider for information if you currently smoke and need help to quit. E-cigarettes or smokeless tobacco still contain nicotine. Talk to your healthcare provider before you use these products.  Do not drink alcohol. Alcohol can trigger a migraine. It can also keep medicines used to treat your migraines from working.  Get regular exercise. Exercise may help prevent migraines. Talk to your healthcare provider about the best exercise plan for you. Try to get at least 30 minutes of exercise on most days.  Manage stress. Stress may trigger a migraine. Learn new ways to relax, such as deep breathing.  Create a sleep schedule. Go to bed and get up at the same times each day. Do not watch television before bed.  Eat regular meals. Include healthy foods such as include fruit, vegetables, whole-grain breads, low-fat dairy products, beans, lean meat, and fish. Do not have food or drinks that trigger your migraines.

## 2024-01-05 NOTE — ED ADULT NURSE NOTE - CHIEF COMPLAINT QUOTE
had brain surgery 8 days ago had a fenestration for cyst in brain Right Side  Dr Ornelas  NoT A CoDe StroKe as per Dr Ramos   large incision noted right side of skull with staples area looks approximated well and dry c/o some incision pain

## 2024-01-05 NOTE — CONSULT NOTE ADULT - SUBJECTIVE AND OBJECTIVE BOX
NEUROSURGERY CONSULT    HPI: 32yo M PMH skull fracture at 3 weeks with years of symptoms including headache, tingling of right cheek/forehead, and loss of peripheral vision in both eyes, found to have cerebral cyst, ,s/p right craniotomy for fenestration of cyst into lateral ventricle (12/27/23). Pt now presenting c/o headaches.     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 Pt now presenting c/o headaches.     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 Pt now presenting c/o headaches x 1 day worsened with light. No trauma, nausea, or vomiting. Pt recently finished steroid course 2 days ago.     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 Pt now presenting c/o headaches x 1 day worsened with light. No trauma, nausea, or vomiting. Pt recently finished steroid course 2 days ago.     RADIOLOGY:     MEDS:      Vital Signs Last 24 Hrs  T(C): --  T(F): --  HR: 81 (05 Jan 2024 11:32) (81 - 81)  BP: 107/75 (05 Jan 2024 11:32) (107/75 - 107/75)  BP(mean): --  RR: 18 (05 Jan 2024 11:32) (18 - 18)  SpO2: 97% (05 Jan 2024 11:32) (97% - 97%)        LABS:      PHYSICAL EXAM:  AOx3, following commands, face symmetric  PERRL, EOMI  GARCIA strong  SILT  No pronator drift  Incision c/d/i

## 2024-01-05 NOTE — ED PROVIDER NOTE - PHYSICAL EXAMINATION
GENERAL: Awake, alert, NAD  HEENT: NC/AT, moist mucous membranes, PERRL, EOMI, Incision site on r occiput clean, no drainage or increased warmth.   LUNGS: CTAB, no wheezes or crackles   CARDIAC: RRR, no m/r/g  ABDOMEN: Soft, non tender, non distended, no rebound, no guarding  BACK: No midline spinal tenderness, no CVA tenderness  EXT: No edema, no calf tenderness, 2+ DP pulses bilaterally, no deformities.  NEURO: A&Ox3. Moving all extremities.  SKIN: Warm and dry. No rash.  PSYCH: Normal affect.

## 2024-01-05 NOTE — ED PROVIDER NOTE - CLINICAL SUMMARY MEDICAL DECISION MAKING FREE TEXT BOX
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  The patient denies chest pain, shortness of breath, urinary symptoms, diarrhea, fevers or chills.  VS.  Nonactionable.  PE.  No focal neurological deficits.  Patient ambulating appropriately.  No nuchal rigidity. Incision site on r occiput clean, no drainage or increased warmth.     The differential is not limited to postsurgical complication, low concern at this time for meningitis or encephalitis, patient with no fever or change in mental status.  Will consult neurosurgery.  Dispo pending labs imaging and reassessment and neurosurgery consultation.  Will obtain basic labs, give pain control and CT stereotactic.

## 2024-01-05 NOTE — CONSULT NOTE ADULT - PROBLEM SELECTOR RECOMMENDATION 9
-CT head  -Decadron loading dose of 6mg, then 3q8h      [id number]  Case to be discussed with attending Dr. Ornelas -CT head  -Decadron loading dose of 6mg, then 3q8h      [id number]  Case to be discussed with attending Dr. Ornelas -Stereo CTH  -Decadron loading dose of 6mg IV, then 3q8h      [id number]  Case discussed with attending Dr. Ornelas -Stereo CTH  -Decadron loading dose of 6mg IV, then 3q8h      [id number]  Case discussed with attending Dr. Ornelas

## 2024-01-19 ENCOUNTER — APPOINTMENT (OUTPATIENT)
Dept: MRI IMAGING | Facility: CLINIC | Age: 34
End: 2024-01-19
Payer: COMMERCIAL

## 2024-01-19 PROCEDURE — 70551 MRI BRAIN STEM W/O DYE: CPT

## 2024-01-24 ENCOUNTER — APPOINTMENT (OUTPATIENT)
Dept: MRI IMAGING | Facility: CLINIC | Age: 34
End: 2024-01-24

## 2024-01-25 ENCOUNTER — TRANSCRIPTION ENCOUNTER (OUTPATIENT)
Age: 34
End: 2024-01-25

## 2024-02-11 NOTE — H&P PST ADULT - TEMPERATURE IN CELSIUS (DEGREES C)
Bed: [facility]-04  Expected date: 2/11/24  Expected time: 8:54 AM  Means of arrival: Ambulance  Comments:  66 M Fall   36

## 2024-02-19 ENCOUNTER — APPOINTMENT (OUTPATIENT)
Dept: MRI IMAGING | Facility: CLINIC | Age: 34
End: 2024-02-19
Payer: COMMERCIAL

## 2024-02-19 PROCEDURE — 70551 MRI BRAIN STEM W/O DYE: CPT

## 2024-09-03 ENCOUNTER — APPOINTMENT (OUTPATIENT)
Dept: SURGERY | Facility: CLINIC | Age: 34
End: 2024-09-03
Payer: COMMERCIAL

## 2024-09-03 VITALS
HEART RATE: 70 BPM | HEIGHT: 68 IN | DIASTOLIC BLOOD PRESSURE: 75 MMHG | BODY MASS INDEX: 25.76 KG/M2 | SYSTOLIC BLOOD PRESSURE: 117 MMHG | WEIGHT: 170 LBS | OXYGEN SATURATION: 98 %

## 2024-09-03 DIAGNOSIS — Z80.9 FAMILY HISTORY OF MALIGNANT NEOPLASM, UNSPECIFIED: ICD-10-CM

## 2024-09-03 DIAGNOSIS — Z98.890 OTHER SPECIFIED POSTPROCEDURAL STATES: ICD-10-CM

## 2024-09-03 DIAGNOSIS — Z87.81 PERSONAL HISTORY OF (HEALED) TRAUMATIC FRACTURE: ICD-10-CM

## 2024-09-03 DIAGNOSIS — R19.00 INTRA-ABDOMINAL AND PELVIC SWELLING, MASS AND LUMP, UNSPECIFIED SITE: ICD-10-CM

## 2024-09-03 PROCEDURE — 99202 OFFICE O/P NEW SF 15 MIN: CPT

## 2024-09-03 NOTE — HISTORY OF PRESENT ILLNESS
[de-identified] : lump in abdomen [de-identified] : This 34 yo M with h/o of noticing a lump on right side of abdomen over the past few years.  Patient states the lump can become mildly painful when he reaches or stretches and when lifting something heavy.  Patient denies increase in size of lump.  Patient denies fever, chills, nausea, vomiting or change in bowel and bladder habits. Patient also with h/o of a lump on right side of back, that also can be mildly painful.

## 2024-09-03 NOTE — PLAN
[FreeTextEntry1] : -Recommend patient apply moist heat to AA for 30 minutes max, twice daily. -Follow up in one month, or sooner if patient notices a lump and henry area.

## 2024-09-03 NOTE — PHYSICAL EXAM
[Normal Breath Sounds] : Normal breath sounds [Normal Heart Sounds] : normal heart sounds [Normal Rate and Rhythm] : normal rate and rhythm [Alert] : alert [Oriented to Person] : oriented to person [Oriented to Place] : oriented to place [Oriented to Time] : oriented to time [Calm] : calm [de-identified] : white male in NAD [de-identified] : Normal BS, soft, no palpable masses or hernias [de-identified] : No testicular masses or palpable inguinal hernia bilateral [de-identified] : soft palpable mass right flank, non-tender US: benign in appearance, deep

## 2024-10-01 ENCOUNTER — APPOINTMENT (OUTPATIENT)
Dept: SURGERY | Facility: CLINIC | Age: 34
End: 2024-10-01

## 2024-10-15 ENCOUNTER — APPOINTMENT (OUTPATIENT)
Dept: SURGERY | Facility: CLINIC | Age: 34
End: 2024-10-15
Payer: COMMERCIAL

## 2024-10-15 PROCEDURE — 99212 OFFICE O/P EST SF 10 MIN: CPT

## 2024-10-17 ENCOUNTER — APPOINTMENT (OUTPATIENT)
Dept: SURGERY | Facility: CLINIC | Age: 34
End: 2024-10-17
Payer: COMMERCIAL

## 2024-10-17 DIAGNOSIS — K80.20 CALCULUS OF GALLBLADDER W/OUT CHOLECYSTITIS W/OUT OBSTRUCTION: ICD-10-CM

## 2024-10-17 DIAGNOSIS — R19.00 INTRA-ABDOMINAL AND PELVIC SWELLING, MASS AND LUMP, UNSPECIFIED SITE: ICD-10-CM

## 2024-10-17 DIAGNOSIS — K82.4 CHOLESTEROLOSIS OF GALLBLADDER: ICD-10-CM

## 2024-10-17 PROCEDURE — 99212 OFFICE O/P EST SF 10 MIN: CPT

## 2024-12-05 NOTE — DISCUSSION/SUMMARY
-- DO NOT REPLY / DO NOT REPLY ALL --  -- This inbox is not monitored. If this was sent to the wrong provider or department, reroute message to [facility] Reroute pool. --  -- Message is from [facility] ([facility]) --    Patient has a pending appt scheduled for 03.24.2025.    Medication: levothyroxine 200 MCG tablet  passed protocol.   Last office visit date: 03.26.2024  Next appointment scheduled?: Yes   Number of refills given: 12 month refill per protocol  _________________________________________    Medication: Semaglutide,0.25 or 0.5MG/DOS, (Ozempic, 0.25 or 0.5 MG/DOSE,) 2 MG/3ML Solution Pen-injector  passed protocol.   Last office visit date: 03.26.2024  Next appointment scheduled?: Yes   Number of refills given: 12 month refill per protocol  _________________________________________    Patient [facility] texted and rx was sent to pharmacy.         [de-identified] : Right gluteal strain. \par Right trochanteric bursitis. \par \par The patient wishes to proceed with a cortisone injection at this time. The skin was prepped with Betadine and alcohol and sprayed with Ethyl Chloride. An injection of 4 cc 1% Lidocaine without epinephrine, 1.0 cc Kenalog 40 mg, and 0.5 cc Dexamethasone was administered into the right trochanteric bursa. The patient tolerated the procedure well. He will rest and apply ice. \par \par The patient wishes to proceed with physical therapy. A script was given. \par Range of motion and stretching exercises encouraged. \par Ibuprofen 800 mg was sent to the patient's pharmacy. \par Follow up as needed.

## 2024-12-07 ENCOUNTER — NON-APPOINTMENT (OUTPATIENT)
Age: 34
End: 2024-12-07

## 2025-01-09 ENCOUNTER — APPOINTMENT (OUTPATIENT)
Dept: MRI IMAGING | Facility: CLINIC | Age: 35
End: 2025-01-09
Payer: COMMERCIAL

## 2025-01-09 PROCEDURE — 70551 MRI BRAIN STEM W/O DYE: CPT

## 2025-06-03 NOTE — DISCHARGE NOTE PROVIDER - [id number]_NEU_ALL_CORE
Have attempted to contact patient 2 times and a letter was sent, patient has been removed from Recall. Please see communication history below.     Communication History   User: CIRILO ANDERSON Date/time: 6/3/2025 4:17 PM   Comment: letter printed and mailed in final attempt   Context: [facility] Recall Report Outcome: Letter sent   Phone number: [phone] Phone type: Mobile   Comm. type: Telephone Call type: Outgoing   Contact: Ludy Pyle Relation to patient:        User: CIRILO ANDERSON Date/time: 6/3/2025 4:17 PM   Comment: 2nd message left   Context: [facility] Recall Report Outcome: Left Message   Phone number: [phone] Phone type: Mobile   Comm. type: Telephone Call type: Outgoing   Contact: Ludy Pyle Relation to patient:        User: CIRILO ANDERSON Date/time: 5/22/2025 11:25 AM   Comment: 1st attempt to schedule   Context: [facility] Recall Report Outcome: Left Message   Phone number: [phone] Phone type: Mobile   Comm. type: Telephone Call type: Outgoing   Contact: Ludy Pyle Relation to patient:        No

## (undated) DEVICE — DRSG XEROFORM 1 X 8"

## (undated) DEVICE — BIPOLAR ISOCOOL TIP 2MM

## (undated) DEVICE — DRSG TELFA 3 X 8

## (undated) DEVICE — PROTECTOR HEEL / ELBOW FLUFFY

## (undated) DEVICE — LONE STAR RETRACTOR RING 12MM BLUNT DISP

## (undated) DEVICE — SOL IRR POUR NS 0.9% 1500ML

## (undated) DEVICE — SUT SILK 2-0 18" TIES

## (undated) DEVICE — VYCOR VIEWSITE BRAIN ACCESS SYSTEM (VBAS) DEVICE 21MM / 15MM / 7CM

## (undated) DEVICE — SNAP ON SPHERZ 3 PACK

## (undated) DEVICE — SOL IRR POUR H2O 1500ML

## (undated) DEVICE — GOWN SMARTGOWN RAGLAN XLG

## (undated) DEVICE — MIDAS REX LEGEND LUBRICANT DIFFUSER CARTRIDGE

## (undated) DEVICE — DRAPE COVER SNAP 36X30"

## (undated) DEVICE — POSITIONER STRAP ARMBOARD VELCRO TS-30

## (undated) DEVICE — DRAPE MICROSCOPE ZEISS

## (undated) DEVICE — WARMING BLANKET LOWER ADULT

## (undated) DEVICE — ELCTR BOVIE PENCIL BLADE 10FT

## (undated) DEVICE — ACRA-CUT CRANIAL PERFORATOR PEDS 11MM X 7MM MINI (BLUE)

## (undated) DEVICE — MIDAS REX LEGEND TAPERED SM BORE 1.1MM X 8CM

## (undated) DEVICE — DRSG TEGADERM 4X4.75"

## (undated) DEVICE — MARKING PEN W RULER

## (undated) DEVICE — DRAPE UTILITY SHEET 44X60"

## (undated) DEVICE — BIPOLAR FORCEP STRYKER STANDARD 8" X 1MM (YELLOW)

## (undated) DEVICE — Device

## (undated) DEVICE — LIJ-STRYKER SONOPET: Type: DURABLE MEDICAL EQUIPMENT

## (undated) DEVICE — DRAPE CRANI INCISION 70X110"

## (undated) DEVICE — DRAPE MICROSCOPE OPMI VISIONGUARD 48X118"

## (undated) DEVICE — POSITIONER FOAM EGG CRATE ULNAR 2PCS (PINK)

## (undated) DEVICE — MIDAS REX LEGEND TAPERED FOOTED SM BORE 1.5MM X 8CM

## (undated) DEVICE — PACK CRANIOTOMY  A

## (undated) DEVICE — SUT NUROLON 4-0 8-18" TF (POP-OFF)

## (undated) DEVICE — ELCTR GROUNDING PAD INFANT COVIDIEN

## (undated) DEVICE — SUT MONOCRYL 5-0 18" P-1 UNDYED

## (undated) DEVICE — CANISTER DISPOSABLE THIN WALL 3000CC

## (undated) DEVICE — PACK CRANIOTOMY  B

## (undated) DEVICE — DRAIN RESERVOIR FOR JACKSON PRATT 100CC CARDINAL

## (undated) DEVICE — VYCOR VIEWSITE BRAIN ACCESS SYSTEM (VBAS) DEVICE 21MM / 15MM / 5CM

## (undated) DEVICE — SUT MONOCRYL 4-0 27" PS-2 UNDYED

## (undated) DEVICE — INTRO SHEATH INTEGRA PD 61CM

## (undated) DEVICE — COVER ULTRASOUND PROBE T-SHAPED INTRAOP SM

## (undated) DEVICE — SUT VICRYL 4-0 18" RB-1 UNDYED (POP-OFF)

## (undated) DEVICE — CATH IV SAFE BC 18G X 1.16" (GREEN)

## (undated) DEVICE — DRSG XEROFORM 5 X 9"

## (undated) DEVICE — DRAIN JACKSON PRATT 7MM FLAT FULL NO TROCAR

## (undated) DEVICE — DRAPE TOWEL BLUE 17" X 24"

## (undated) DEVICE — GLV 7.5 PROTEXIS (WHITE)

## (undated) DEVICE — DRAPE SPLIT SHEET 77" X 120"

## (undated) DEVICE — DRSG TAPE HYPAFIX 4"

## (undated) DEVICE — DRSG CURITY GAUZE SPONGE 4 X 4" 12-PLY

## (undated) DEVICE — LABELS BLANK W PEN

## (undated) DEVICE — SYR LUER LOK 10CC

## (undated) DEVICE — SUT VICRYL 3-0 18" SH UNDYED (POP-OFF)

## (undated) DEVICE — LIJ-MEDTRONIC STEALTH NAVIGATION: Type: DURABLE MEDICAL EQUIPMENT

## (undated) DEVICE — CLIPPER BLADE NEURO (BLUE)

## (undated) DEVICE — STAPLER SKIN VISI-STAT 35 WIDE

## (undated) DEVICE — COTTON BALL DBL STRUNG 1IN LG 5/PK 50 PKS/CA

## (undated) DEVICE — ELCTR GROUNDING PAD ADULT COVIDIEN

## (undated) DEVICE — MIDAS REX LEGEND TAPERED SM BORE 2.3MM X 8CM

## (undated) DEVICE — SOL IRR POUR NS 0.9% 500ML

## (undated) DEVICE — PACK NEURO MINOR

## (undated) DEVICE — SOL IRR POUR H2O 500ML

## (undated) DEVICE — DRAPE TOWEL BLUE STICKY

## (undated) DEVICE — DRAPE 3/4 SHEET 52X76"

## (undated) DEVICE — WARMING BLANKET UNDERBODY PEDS 36 X 33"

## (undated) DEVICE — TUBING SUCTION NONCONDUCTIVE 6MM X 12FT

## (undated) DEVICE — DRAPE MAYO STAND 30"

## (undated) DEVICE — SUT NUROLON 4-0 8-18" RB-1 (POP-OFF)

## (undated) DEVICE — VENODYNE/SCD SLEEVE CALF MEDIUM

## (undated) DEVICE — PREP DURAPREP 26CC

## (undated) DEVICE — STAPLER SKIN MULTI DIRECTION W35

## (undated) DEVICE — ACRA-CUT CRANIAL PERFORATOR ADULT 14MM X 11MM (WHITE)

## (undated) DEVICE — SUT ETHILON 3-0 18" FS-1